# Patient Record
Sex: MALE | Race: WHITE | NOT HISPANIC OR LATINO | Employment: FULL TIME | URBAN - METROPOLITAN AREA
[De-identification: names, ages, dates, MRNs, and addresses within clinical notes are randomized per-mention and may not be internally consistent; named-entity substitution may affect disease eponyms.]

---

## 2020-03-11 ENCOUNTER — OFFICE VISIT (OUTPATIENT)
Dept: UROLOGY | Facility: MEDICAL CENTER | Age: 60
End: 2020-03-11
Payer: COMMERCIAL

## 2020-03-11 VITALS
WEIGHT: 209.2 LBS | BODY MASS INDEX: 30.98 KG/M2 | HEIGHT: 69 IN | HEART RATE: 93 BPM | DIASTOLIC BLOOD PRESSURE: 80 MMHG | SYSTOLIC BLOOD PRESSURE: 142 MMHG

## 2020-03-11 DIAGNOSIS — N13.8 BPH WITH OBSTRUCTION/LOWER URINARY TRACT SYMPTOMS: Primary | ICD-10-CM

## 2020-03-11 DIAGNOSIS — N40.1 BPH WITH OBSTRUCTION/LOWER URINARY TRACT SYMPTOMS: Primary | ICD-10-CM

## 2020-03-11 LAB
POST-VOID RESIDUAL VOLUME, ML POC: 52 ML
SL AMB  POCT GLUCOSE, UA: NORMAL
SL AMB LEUKOCYTE ESTERASE,UA: NORMAL
SL AMB POCT BILIRUBIN,UA: NORMAL
SL AMB POCT BLOOD,UA: NORMAL
SL AMB POCT CLARITY,UA: CLEAR
SL AMB POCT COLOR,UA: YELLOW
SL AMB POCT KETONES,UA: NORMAL
SL AMB POCT NITRITE,UA: NORMAL
SL AMB POCT PH,UA: 5.5
SL AMB POCT SPECIFIC GRAVITY,UA: 1.02
SL AMB POCT URINE PROTEIN: NORMAL
SL AMB POCT UROBILINOGEN: 0.2

## 2020-03-11 PROCEDURE — 81003 URINALYSIS AUTO W/O SCOPE: CPT | Performed by: UROLOGY

## 2020-03-11 PROCEDURE — 99244 OFF/OP CNSLTJ NEW/EST MOD 40: CPT | Performed by: UROLOGY

## 2020-03-11 PROCEDURE — 51798 US URINE CAPACITY MEASURE: CPT | Performed by: UROLOGY

## 2020-03-11 RX ORDER — PRASUGREL 10 MG/1
10 TABLET, FILM COATED ORAL DAILY
COMMUNITY
Start: 2017-12-29

## 2020-03-11 RX ORDER — FINASTERIDE 5 MG/1
5 TABLET, FILM COATED ORAL DAILY
Qty: 90 TABLET | Refills: 3 | Status: SHIPPED | OUTPATIENT
Start: 2020-03-11 | End: 2020-03-13 | Stop reason: SDUPTHER

## 2020-03-11 RX ORDER — LISINOPRIL 20 MG/1
20 TABLET ORAL DAILY
COMMUNITY
Start: 2013-05-14

## 2020-03-11 RX ORDER — DULOXETIN HYDROCHLORIDE 30 MG/1
30 CAPSULE, DELAYED RELEASE ORAL DAILY
COMMUNITY
Start: 2020-01-16

## 2020-03-11 RX ORDER — LEVOTHYROXINE SODIUM 0.03 MG/1
25 TABLET ORAL DAILY
COMMUNITY
Start: 2013-05-14

## 2020-03-11 RX ORDER — ESOMEPRAZOLE MAGNESIUM 40 MG/1
40 CAPSULE, DELAYED RELEASE ORAL
COMMUNITY

## 2020-03-11 RX ORDER — ROSUVASTATIN CALCIUM 20 MG/1
20 TABLET, COATED ORAL DAILY
COMMUNITY
Start: 2020-01-13

## 2020-03-11 RX ORDER — SILODOSIN 8 MG/1
CAPSULE ORAL
COMMUNITY
End: 2020-05-15 | Stop reason: SDUPTHER

## 2020-03-11 RX ORDER — METOPROLOL TARTRATE 50 MG/1
25 TABLET, FILM COATED ORAL EVERY 12 HOURS SCHEDULED
COMMUNITY
Start: 2017-12-29

## 2020-03-11 NOTE — PROGRESS NOTES
Assessment/Plan:      Diagnoses and all orders for this visit:    BPH with obstruction/lower urinary tract symptoms  -     POCT Measure PVR  -     finasteride (PROSCAR) 5 mg tablet; Take 1 tablet (5 mg total) by mouth daily  -     Cystoscopy; Future    Other orders  -     metoprolol tartrate (LOPRESSOR) 50 mg tablet; Take 25 mg by mouth  -     prasugrel (EFFIENT) tablet; Take 10 mg by mouth  -     rosuvastatin (CRESTOR) 20 MG tablet  -     DULoxetine (CYMBALTA) 30 mg delayed release capsule  -     levothyroxine 25 mcg tablet; Take 25 mcg by mouth  -     lisinopril (ZESTRIL) 20 mg tablet; Take 20 mg by mouth  -     esomeprazole (NexIUM) 40 MG capsule; Take 40 mg by mouth every morning before breakfast  -     Silodosin (Rapaflo) 8 MG CAPS; Take by mouth        Plan:  Patient would like to try the finasteride for 6 months, and if he perceives a benefit then he will remain on such  Will give him an appointment to see Dr Brenda Teresa in 6 months for cystoscopy, where if needed, he will be able to  which of the prostate intervention would be best for the patient to proceed with  Subjective:  Progressive BPH symptoms     Patient ID: Josias Andrade is a 60 y o  male  HPI  Patient has had a history of BPH for several years has been on Rapaflo  He is developing more symptoms with difficulty voiding  He is interested in proceeding with some alternative treatments  Options included adding finasteride, or possibly a minimally invasive or surgical option such as the GreenLight, Urolift and TURP  Review of Systems   Constitutional: Negative  HENT: Negative  Eyes: Negative  Respiratory: Negative  Cardiovascular: Negative  Gastrointestinal: Negative  Endocrine: Negative  Genitourinary: Positive for difficulty urinating  Musculoskeletal: Negative  Skin: Negative  Allergic/Immunologic: Negative  Neurological: Negative  Hematological: Negative  Psychiatric/Behavioral: Negative  Objective:     Physical Exam   Constitutional: He is oriented to person, place, and time  He appears well-developed and well-nourished  No distress  HENT:   Head: Normocephalic and atraumatic  Nose: Nose normal    Mouth/Throat: Oropharynx is clear and moist    Eyes: Pupils are equal, round, and reactive to light  Conjunctivae and EOM are normal  No scleral icterus  Neck: Normal range of motion  Neck supple  Cardiovascular: Normal rate, regular rhythm, normal heart sounds and intact distal pulses  No murmur heard  Pulmonary/Chest: Effort normal and breath sounds normal  No respiratory distress  He has no wheezes  He has no rales  Abdominal: Soft  Bowel sounds are normal  He exhibits no distension and no mass  There is no tenderness  Genitourinary: Prostate is enlarged  Genitourinary Comments: Prostate exam:  2 to 3/4 enlargement, smooth, with no nodules or induration  Musculoskeletal: Normal range of motion  He exhibits no edema or tenderness  Lymphadenopathy:     He has no cervical adenopathy  Neurological: He is alert and oriented to person, place, and time  No cranial nerve deficit  Skin: Skin is warm and dry  No rash noted  No erythema  No pallor  Psychiatric: He has a normal mood and affect  His behavior is normal  Judgment and thought content normal    Nursing note and vitals reviewed        Bladder scan PVR today was 52 cc    PSA, TOTAL (SCREENING)Resulted: 1/28/2020  [facility]  Component Name Value Ref Range   PSA, Total 1 5      The findings below are from the cystoscopy performed at [facility]  Findings include:  Urethra - Normal, no evidence of any stricture disease  Prostate - Bilobar obstructing hypertrophy, Massive median lobe, severe IPP  UO's - orthotopic, clear efflux B/L, far away from bladder neck  Urothelium - No abnormalities  Wall - Multiple trabeculations, No diverticula

## 2020-03-12 DIAGNOSIS — N40.1 BPH WITH OBSTRUCTION/LOWER URINARY TRACT SYMPTOMS: ICD-10-CM

## 2020-03-12 DIAGNOSIS — N13.8 BPH WITH OBSTRUCTION/LOWER URINARY TRACT SYMPTOMS: ICD-10-CM

## 2020-03-12 NOTE — TELEPHONE ENCOUNTER
Patient managed by Dr Cheyanne Wilkerson seen at [facility]    Patient called in stating medication finasteride should go to [facility]  [address] [place], [address] [address]  Phone number [phone]   Order was sent to [facility]    Patient can be reached at [phone]

## 2020-03-13 RX ORDER — FINASTERIDE 5 MG/1
5 TABLET, FILM COATED ORAL DAILY
Qty: 90 TABLET | Refills: 3 | Status: SHIPPED | OUTPATIENT
Start: 2020-03-13 | End: 2021-07-16 | Stop reason: ALTCHOICE

## 2020-03-13 NOTE — TELEPHONE ENCOUNTER
Call transfer to me  Dr Osbaldo Cuellar sent the original script to [facility] when it should have gone to [facility]/pharmacy in [place]  Pharmacy information was not updated correctly at check-in    Pharmacy information corrected accordingly and the script was requeued and forwarded to the Advanced Practitioner covering the [facility] location for approval

## 2020-05-13 ENCOUNTER — TELEPHONE (OUTPATIENT)
Dept: OTHER | Facility: OTHER | Age: 60
End: 2020-05-13

## 2020-05-13 DIAGNOSIS — N52.9 ERECTILE DYSFUNCTION, UNSPECIFIED ERECTILE DYSFUNCTION TYPE: Primary | ICD-10-CM

## 2020-05-13 DIAGNOSIS — N40.0 BPH WITHOUT OBSTRUCTION/LOWER URINARY TRACT SYMPTOMS: ICD-10-CM

## 2020-05-15 ENCOUNTER — TELEPHONE (OUTPATIENT)
Dept: UROLOGY | Facility: MEDICAL CENTER | Age: 60
End: 2020-05-15

## 2020-05-15 RX ORDER — TADALAFIL 20 MG/1
20 TABLET ORAL DAILY PRN
Qty: 10 TABLET | Refills: 0 | Status: SHIPPED | OUTPATIENT
Start: 2020-05-15 | End: 2021-07-16 | Stop reason: SDUPTHER

## 2020-05-15 RX ORDER — SILODOSIN 8 MG/1
8 CAPSULE ORAL DAILY
Qty: 90 CAPSULE | Refills: 3 | Status: SHIPPED | OUTPATIENT
Start: 2020-05-15 | End: 2021-05-11

## 2020-07-06 ENCOUNTER — PROCEDURE VISIT (OUTPATIENT)
Dept: UROLOGY | Facility: MEDICAL CENTER | Age: 60
End: 2020-07-06
Payer: COMMERCIAL

## 2020-07-06 VITALS
BODY MASS INDEX: 30.07 KG/M2 | SYSTOLIC BLOOD PRESSURE: 130 MMHG | HEART RATE: 71 BPM | WEIGHT: 203 LBS | DIASTOLIC BLOOD PRESSURE: 76 MMHG | HEIGHT: 69 IN

## 2020-07-06 DIAGNOSIS — N40.1 BPH WITH OBSTRUCTION/LOWER URINARY TRACT SYMPTOMS: Primary | ICD-10-CM

## 2020-07-06 DIAGNOSIS — N13.8 BPH WITH OBSTRUCTION/LOWER URINARY TRACT SYMPTOMS: Primary | ICD-10-CM

## 2020-07-06 LAB
POST-VOID RESIDUAL VOLUME, ML POC: 89 ML
SL AMB  POCT GLUCOSE, UA: NORMAL
SL AMB LEUKOCYTE ESTERASE,UA: NORMAL
SL AMB POCT BILIRUBIN,UA: NORMAL
SL AMB POCT BLOOD,UA: NORMAL
SL AMB POCT CLARITY,UA: CLEAR
SL AMB POCT COLOR,UA: YELLOW
SL AMB POCT KETONES,UA: NORMAL
SL AMB POCT NITRITE,UA: NORMAL
SL AMB POCT PH,UA: 5.5
SL AMB POCT SPECIFIC GRAVITY,UA: 1.02
SL AMB POCT URINE PROTEIN: NORMAL
SL AMB POCT UROBILINOGEN: 0.2

## 2020-07-06 PROCEDURE — 99214 OFFICE O/P EST MOD 30 MIN: CPT | Performed by: UROLOGY

## 2020-07-06 PROCEDURE — 76872 US TRANSRECTAL: CPT | Performed by: UROLOGY

## 2020-07-06 PROCEDURE — 52000 CYSTOURETHROSCOPY: CPT | Performed by: UROLOGY

## 2020-07-06 PROCEDURE — 81003 URINALYSIS AUTO W/O SCOPE: CPT | Performed by: UROLOGY

## 2020-07-06 PROCEDURE — 51798 US URINE CAPACITY MEASURE: CPT | Performed by: UROLOGY

## 2020-07-06 RX ORDER — ALPRAZOLAM 0.5 MG/1
TABLET ORAL
COMMUNITY
Start: 2020-04-21

## 2020-07-06 RX ORDER — LORATADINE 10 MG
81 TABLET ORAL DAILY
COMMUNITY
Start: 2020-05-28

## 2020-07-06 NOTE — PATIENT INSTRUCTIONS

## 2020-07-06 NOTE — LETTER
July 6, 2020     Jan Mendez MD  [address]  [facility] [address] [address]    Patient: Sony Hines   YOB: 1960   Date of Visit: 7/6/2020       Dear Dr Jacinda Zelaya: Thank you for referring Sony Hines to me for evaluation  Below are my notes for this consultation  If you have questions, please do not hesitate to call me  I look forward to following your patient along with you  Sincerely,        Denzel Snell MD        CC: No Recipients  Denzel Snell MD  7/6/2020 11:31 AM  Sign at close encounter  Assessment/Plan:    BPH with obstruction/lower urinary tract symptoms    AUA symptom score is 11 on Rapaflo  He is mixed about his voiding pattern  Cystoscopically there is a large median lobe that does not appear amenable to urolift  We discussed other treatment options including GreenLight laser as well as transurethral resection of the prostate  The pros and cons of each were discussed  At the conclusion were discussion the patient was leaning toward GreenLight laser however he wished to discuss his options further with his  Wife  He will call should he wish to proceed  GreenLight laser information was given to the patient  Diagnoses and all orders for this visit:    BPH with obstruction/lower urinary tract symptoms  -     POCT Measure PVR  -     POCT urine dip auto non-scope  -     Uroflow    Other orders  -     ALPRAZolam (XANAX) 0 5 mg tablet; TAKE 1/2 1 TABLETS (0 25 0 5 MG TOTAL) BY MOUTH DAILY AS NEEDED  FOR ANXIETY  -     [facility] ASPIRIN ADULT LOW DOSE 81 MG chewable tablet; Chew 81 mg daily  -     metFORMIN (GLUCOPHAGE) 1000 MG tablet; Take 1,000 mg by mouth 2 (two) times a day with meals  -     Cystoscopy          Subjective:      Patient ID: Sony Hinse is a 63 y o  male  Benign Prostatic Hypertrophy   This is a chronic problem  The current episode started more than 1 year ago  The problem is unchanged  Irritative symptoms do not include frequency, nocturia or urgency  Obstructive symptoms include dribbling, a slower stream and a weak stream  Obstructive symptoms do not include incomplete emptying, an intermittent stream or straining  Pertinent negatives include no chills, dysuria, genital pain, hematuria, hesitancy, nausea or vomiting  AUA score is 8-19  His sexual activity is non-contributory to the current illness  The symptoms are aggravated by caffeine and alcohol  Treatments tried: rapaflo  The treatment provided mild relief  He has been using treatment for 2 or more years  He tried Finasteride but it caused side effects and he discontinued the medication  The following portions of the patient's history were reviewed and updated as appropriate: allergies, current medications, past family history, past medical history, past social history, past surgical history and problem list     Review of Systems   Constitutional: Negative for chills, diaphoresis, fatigue and fever  HENT: Negative  Eyes: Negative  Respiratory: Negative  Cardiovascular: Negative  Gastrointestinal: Negative for nausea and vomiting  Endocrine: Negative  Genitourinary: Negative for dysuria, frequency, hematuria, hesitancy, incomplete emptying, nocturia and urgency  See HPI   Musculoskeletal: Negative  Skin: Negative  Allergic/Immunologic: Negative  Neurological: Negative  Hematological: Negative  Psychiatric/Behavioral: Negative  Objective:      /76   Pulse 71   Ht 5' 9" (1 753 m)   Wt 92 1 kg (203 lb)   BMI 29 98 kg/m²           Physical Exam   Constitutional: He is oriented to person, place, and time  He appears well-developed and well-nourished  HENT:   Head: Normocephalic and atraumatic  Eyes: Conjunctivae are normal    Neck: Neck supple  Cardiovascular: Normal rate  Pulmonary/Chest: Effort normal    Abdominal: Soft  Bowel sounds are normal  He exhibits no distension and no mass  There is no tenderness   There is no rebound, no guarding and no CVA tenderness  No hernia  Genitourinary: Rectum normal, testes normal and penis normal  Right testis shows no mass  Left testis shows no mass  No phimosis or hypospadias  Genitourinary Comments: Prostate 2+ enlarged and palpably benign   Musculoskeletal: He exhibits no edema  Neurological: He is alert and oriented to person, place, and time  Skin: Skin is warm and dry  Psychiatric: He has a normal mood and affect  His behavior is normal  Judgment and thought content normal    Vitals reviewed  Cystoscopy  Date/Time: 7/6/2020 11:08 AM  Performed by: Omar Kerr MD  Authorized by: Omar Kerr MD     Procedure details: cystoscopy    Patient tolerance: Patient tolerated the procedure well with no immediate complications    Additional Procedure Details: Cystoscopy Procedure Note        Pre-operative Diagnosis: Voiding dysfunction    Post-operative Diagnosis: Prostatic obstruction with large median lobe      Procedure Details   The risks, benefits, complications, treatment options, and expected outcomes were discussed with the patient  The patient concurred with the proposed plan, giving informed consent  Cystoscopy was performed today under local anesthesia, using sterile technique  The patient was placed in the supine position, prepped and draped in the usual sterile fashion  A 15 Maori flexible cystoscope  was used to inspect both the urethra and bladder  Findings:  Normal urethra, trilobar prostatic hypertrophy approximately 60 g with a very enlarged median lobe  Ureteral orifices are normal   Bladder is moderately trabeculated  There are no tumors or diverticula noted  Transrectal ultrasound of the prostate     transrectal ultrasound the prostate was performed with the 8 megahertz transrectal probe  The seminal vesicles are normal appearance  The prostate is enlarged measuring 75 g in size    There are no hypoechoic lesions noted in the peripheral zone  The transition zone is enlarged and heterogeneous in echotexture  Scattered cysts and calcifications are noted  There is a large median lobe  The remainder of the visualized bladder is unremarkable  The prostate measures 5 2 cm in height, 5 cm in width and 5 5 cm in length  Impression: Prostatic enlargement- enlarged median lobe  Uroflow with PVR      The patient voided 143 cc with a maximum flow of 2 9 mL/sec  The voiding pattern is prolonged period postvoid residuals 89 cc  Impression:  Slow urinary stream with prolonged voiding time and low peak flow consistent with outlet obstruction

## 2020-07-06 NOTE — ASSESSMENT & PLAN NOTE
AUA symptom score is 11 on Rapaflo  He is mixed about his voiding pattern  Cystoscopically there is a large median lobe that does not appear amenable to urolift  We discussed other treatment options including GreenLight laser as well as transurethral resection of the prostate  The pros and cons of each were discussed  At the conclusion were discussion the patient was leaning toward GreenLight laser however he wished to discuss his options further with his  Wife  He will call should he wish to proceed  GreenLight laser information was given to the patient

## 2020-07-06 NOTE — PROGRESS NOTES
Uroflow  Date/Time: 7/6/2020 11:13 AM  Performed by: Johanna Schneider MA  Authorized by: Johanna Schneider MA       Comments:      Uroflow was performed following cystoscopy  The results are reviewed by Dr Paul Joel and noted on his procedure note

## 2021-05-11 DIAGNOSIS — N40.0 BPH WITHOUT OBSTRUCTION/LOWER URINARY TRACT SYMPTOMS: ICD-10-CM

## 2021-05-11 RX ORDER — SILODOSIN 8 MG/1
CAPSULE ORAL
Qty: 90 CAPSULE | Refills: 3 | Status: SHIPPED | OUTPATIENT
Start: 2021-05-11 | End: 2021-07-16 | Stop reason: SDUPTHER

## 2021-07-16 ENCOUNTER — OFFICE VISIT (OUTPATIENT)
Dept: UROLOGY | Facility: MEDICAL CENTER | Age: 61
End: 2021-07-16
Payer: COMMERCIAL

## 2021-07-16 VITALS
DIASTOLIC BLOOD PRESSURE: 80 MMHG | HEART RATE: 67 BPM | SYSTOLIC BLOOD PRESSURE: 110 MMHG | HEIGHT: 65 IN | BODY MASS INDEX: 34.16 KG/M2 | WEIGHT: 205 LBS

## 2021-07-16 DIAGNOSIS — E11.9 TYPE 2 DIABETES MELLITUS WITHOUT COMPLICATION, WITHOUT LONG-TERM CURRENT USE OF INSULIN (HCC): ICD-10-CM

## 2021-07-16 DIAGNOSIS — N52.9 ERECTILE DYSFUNCTION, UNSPECIFIED ERECTILE DYSFUNCTION TYPE: ICD-10-CM

## 2021-07-16 DIAGNOSIS — N40.1 BPH WITH OBSTRUCTION/LOWER URINARY TRACT SYMPTOMS: Primary | ICD-10-CM

## 2021-07-16 DIAGNOSIS — N40.0 BPH WITHOUT OBSTRUCTION/LOWER URINARY TRACT SYMPTOMS: ICD-10-CM

## 2021-07-16 DIAGNOSIS — N13.8 BPH WITH OBSTRUCTION/LOWER URINARY TRACT SYMPTOMS: Primary | ICD-10-CM

## 2021-07-16 PROCEDURE — 99214 OFFICE O/P EST MOD 30 MIN: CPT | Performed by: UROLOGY

## 2021-07-16 RX ORDER — BENZONATATE 100 MG/1
CAPSULE ORAL
COMMUNITY
Start: 2021-06-03 | End: 2022-01-06

## 2021-07-16 RX ORDER — CHLORAL HYDRATE 500 MG
1200 CAPSULE ORAL DAILY
COMMUNITY

## 2021-07-16 RX ORDER — ASCORBIC ACID 500 MG
500 TABLET ORAL DAILY
COMMUNITY

## 2021-07-16 RX ORDER — SILODOSIN 8 MG/1
1 CAPSULE ORAL DAILY
Qty: 90 CAPSULE | Refills: 3 | Status: SHIPPED | OUTPATIENT
Start: 2021-07-16

## 2021-07-16 RX ORDER — TADALAFIL 20 MG/1
20 TABLET ORAL DAILY PRN
Qty: 30 TABLET | Refills: 3 | Status: SHIPPED | OUTPATIENT
Start: 2021-07-16

## 2021-07-16 RX ORDER — DUTASTERIDE 0.5 MG/1
0.5 CAPSULE, LIQUID FILLED ORAL 3 TIMES WEEKLY
Qty: 60 CAPSULE | Refills: 3 | Status: SHIPPED | OUTPATIENT
Start: 2021-07-16

## 2021-07-16 NOTE — PROGRESS NOTES
Assessment/Plan:    BPH with obstruction/lower urinary tract symptoms  AUA symptom score is presently 11  He is mixed about his voiding pattern  He has not had a recent PSA but digital rectal examination is benign in nature  Options were discussed  The patient is still considering photoselective vaporization the prostate with GreenLight laser  In the interim I recommended he continue still dose in 8 mg daily  He will also begin dutasteride 3 times weekly to decrease prostate size  He will obtain a PSA level and will return in 1 year if all is well  Erectile dysfunction  Viagra has been less effective  The patient requested a trial of Cialis 20 mg  Use and side effects were discussed  Diagnoses and all orders for this visit:    BPH with obstruction/lower urinary tract symptoms  -     PSA Total, Diagnostic; Future  -     dutasteride (AVODART) 0 5 mg capsule; Take 1 capsule (0 5 mg total) by mouth 3 (three) times a week  -     PSA Total, Diagnostic  -     PSA Total, Diagnostic; Future  -     PSA Total, Diagnostic    Erectile dysfunction, unspecified erectile dysfunction type  -     tadalafil (CIALIS) 20 MG tablet; Take 1 tablet (20 mg total) by mouth daily as needed for erectile dysfunction (no more than 2 doses weekly)    BPH without obstruction/lower urinary tract symptoms  -     Silodosin 8 MG CAPS; Take 1 capsule by mouth daily    Type 2 diabetes mellitus without complication, without long-term current use of insulin (HCC)    Other orders  -     benzonatate (TESSALON PERLES) 100 mg capsule; TAKE 1 CAPSULE BY MOUTH THREE TIMES A DAY AS NEEDED FOR COUGH  -     ascorbic acid (VITAMIN C) 500 MG tablet; Take 500 mg by mouth daily  -     Omega-3 1000 MG CAPS; Take 1,200 mg by mouth daily          Subjective:      Patient ID: Melissa Park is a 57 y o  male  Benign Prostatic Hypertrophy  This is a chronic problem  The current episode started more than 1 year ago  The problem is unchanged   Irritative symptoms do not include frequency, nocturia or urgency  Obstructive symptoms include a slower stream and straining  Obstructive symptoms do not include dribbling, incomplete emptying, an intermittent stream or a weak stream  Pertinent negatives include no chills, dysuria, genital pain, hematuria, hesitancy, nausea or vomiting  AUA score is 8-19  His sexual activity is non-contributory to the current illness  Past treatments include finasteride (rapaflo)  The treatment provided mild relief  Erectile Dysfunction  This is a chronic problem  The current episode started more than 1 year ago  The problem is unchanged  The nature of his difficulty is maintaining erection  Irritative symptoms do not include frequency, nocturia or urgency  Obstructive symptoms include a slower stream and straining  Obstructive symptoms do not include dribbling, incomplete emptying, an intermittent stream or a weak stream  Pertinent negatives include no chills, dysuria, genital pain, hematuria or hesitancy  Past treatments include tadalafil  The treatment provided moderate relief  He has been using treatment for 1 to 2 years  He has had no adverse reactions caused by medications  Risk factors include BPH  The following portions of the patient's history were reviewed and updated as appropriate: allergies, current medications, past family history, past medical history, past social history, past surgical history and problem list     Review of Systems   Constitutional: Negative for chills, diaphoresis, fatigue and fever  HENT: Negative  Eyes: Negative  Respiratory: Negative  Cardiovascular: Negative  Gastrointestinal: Negative for nausea and vomiting  Endocrine: Negative  Genitourinary: Negative for dysuria, frequency, hematuria, hesitancy, incomplete emptying, nocturia and urgency  See HPI   Musculoskeletal: Negative  Skin: Negative  Allergic/Immunologic: Negative  Neurological: Negative  Hematological: Negative  Psychiatric/Behavioral: Negative  AUA SYMPTOM SCORE      Most Recent Value   AUA SYMPTOM SCORE   How often have you had a sensation of not emptying your bladder completely after you finished urinating? 2   How often have you had to urinate again less than two hours after you finished urinating? 1   How often have you found you stopped and started again several times when you urinate? 1   How often have you found it difficult to postpone urination? 2   How often have you had a weak urinary stream?  2   How often have you had to push or strain to begin urination? 2   How many times did you most typically get up to urinate from the time you went to bed at night until the time you got up in the morning? 1   Quality of Life: If you were to spend the rest of your life with your urinary condition just the way it is now, how would you feel about that?  3   AUA SYMPTOM SCORE  11        Objective:      /80   Pulse 67   Ht 5' 5" (1 651 m)   Wt 93 kg (205 lb)   BMI 34 11 kg/m²          Physical Exam  Vitals reviewed  Constitutional:       General: He is not in acute distress  Appearance: Normal appearance  He is well-developed and normal weight  He is not ill-appearing, toxic-appearing or diaphoretic  HENT:      Head: Normocephalic and atraumatic  Eyes:      General: No scleral icterus  Conjunctiva/sclera: Conjunctivae normal    Cardiovascular:      Rate and Rhythm: Normal rate  Pulmonary:      Effort: Pulmonary effort is normal    Abdominal:      General: There is no distension  Palpations: Abdomen is soft  There is no mass  Tenderness: There is no abdominal tenderness  There is no right CVA tenderness, left CVA tenderness, guarding or rebound  Hernia: No hernia is present  Comments: No hernia   Genitourinary:     Penis: Normal        Testes: Normal       Comments: Prostate 2+ enlarged and palpably benign    Musculoskeletal: General: Normal range of motion  Cervical back: Neck supple  Skin:     General: Skin is warm and dry  Neurological:      General: No focal deficit present  Mental Status: He is alert and oriented to person, place, and time  Mental status is at baseline  Psychiatric:         Mood and Affect: Mood normal          Behavior: Behavior normal          Thought Content:  Thought content normal          Judgment: Judgment normal

## 2021-07-16 NOTE — ASSESSMENT & PLAN NOTE
Viagra has been less effective  The patient requested a trial of Cialis 20 mg  Use and side effects were discussed

## 2021-07-16 NOTE — PATIENT INSTRUCTIONS
Tadalafil (By mouth)   Tadalafil ([id number])  Treats erectile dysfunction and the symptoms of benign prostatic hyperplasia (enlarged prostate)  Also treats pulmonary arterial hypertension (high blood pressure in the lungs) to improve your exercise ability  Brand Name(s): Adcirca, Alyq, Cialis   There may be other brand names for this medicine  When This Medicine Should Not Be Used: This medicine is not right for everyone  Do not use it if you had an allergic reaction to tadalafil  How to Use This Medicine:   Tablet  · Take your medicine as directed  Your dose may need to be changed several times to find what works best for you  · Swallow the tablet whole  Do not split, break, chew, or crush it  · Use only the brand of medicine your doctor prescribed  Other brands may not work the same way  · Read and follow the patient instructions that come with this medicine  Talk to your doctor or pharmacist if you have any questions  · Missed dose: Take a dose as soon as you remember  If it is almost time for your next dose, wait until then and take a regular dose  Do not take extra medicine to make up for a missed dose  · Store the medicine in a closed container at room temperature, away from heat, moisture, and direct light  Drugs and Foods to Avoid:   Ask your doctor or pharmacist before using any other medicine, including over-the-counter medicines, vitamins, and herbal products  · Do not use this medicine if you are also taking riociguat or a nitrate medicine  You may use a nitrate medicine at least 48 hours after your last dose of tadalafil  Do not take other medicines that contain tadalafil or similar medicines, including sildenafil or vardenafil  · Some foods and medicines can affect how tadalafil works  Tell your doctor if you are using any of the following:  ? Bosentan, carbamazepine, erythromycin, indinavir, itraconazole, ketoconazole, phenobarbital, phenytoin, rifampin, ritonavir  ?  Blood pressure medicine (including alfuzosin, amlodipine, bendroflumethiazide, candesartan, doxazosin, enalapril, losartan, metoprolol, tamsulosin, terazosin)  ? Medicine to treat prostate problems  · Do not have 5 or more alcohol-containing drinks in a short period of time while you are using this medicine  · Do not eat grapefruit or drink grapefruit juice while you are using this medicine  Warnings While Using This Medicine:   · Tell your doctor if you are pregnant or breastfeeding, or if you have kidney disease, liver disease, lung or breathing problems, diabetes, high cholesterol, cancer (including leukemia, multiple myeloma), sickle cell anemia, bleeding problems, stomach ulcer, problems with your penis, or eye problems  Tell your doctor if you have angina or chest pain during sex, heart disease, heart rhythm problems, high or low blood pressure, or a history of heart attack or stroke  Also tell your doctor if you smoke or drink alcohol  · Tell any doctor who treats you that you are using tadalafil  · This medicine may cause the following problems:   ? Low blood pressure (especially if taken with other medicines that lower blood pressure)  ? Painful or prolonged erection  ? Hearing or vision problems  · Your doctor will do lab tests at regular visits to check on the effects of this medicine  Keep all appointments  · Keep all medicine out of the reach of children  Never share your medicine with anyone    Possible Side Effects While Using This Medicine:   Call your doctor right away if you notice any of these side effects:  · Allergic reaction: Itching or hives, swelling in your face or hands, swelling or tingling in your mouth or throat, chest tightness, trouble breathing  · Blistering, peeling, or red skin rash  · Chest pain, trouble breathing  · Lightheadedness, fainting  · Painful erection or an erection that lasts longer than 4 hours with or without pain  · Sudden decrease in hearing or hearing loss, ringing in the ears, dizziness  · Sudden loss of vision  If you notice these less serious side effects, talk with your doctor:   · Back or muscle pain  · Headache  · Stuffy or runny nose  · Upset stomach, nausea  · Warmth or redness in your face, neck, arms, or upper chest  If you notice other side effects that you think are caused by this medicine, tell your doctor  Call your doctor for medical advice about side effects  You may report side effects to FDA at 5-103-ZVI-3229  © Copyright [address] 2021 Information is for End User's use only and may not be sold, redistributed or otherwise used for commercial purposes  The above information is an  only  It is not intended as medical advice for individual conditions or treatments  Talk to your doctor, nurse or pharmacist before following any medical regimen to see if it is safe and effective for you

## 2021-07-16 NOTE — ASSESSMENT & PLAN NOTE
AUA symptom score is presently 11  He is mixed about his voiding pattern  He has not had a recent PSA but digital rectal examination is benign in nature  Options were discussed  The patient is still considering photoselective vaporization the prostate with GreenLight laser  In the interim I recommended he continue still dose in 8 mg daily  He will also begin dutasteride 3 times weekly to decrease prostate size  He will obtain a PSA level and will return in 1 year if all is well

## 2021-07-18 LAB
BUN SERPL-MCNC: 17 MG/DL (ref 8–27)
BUN/CREAT SERPL: 16 (ref 10–24)
CALCIUM SERPL-MCNC: 9.2 MG/DL (ref 8.6–10.2)
CHLORIDE SERPL-SCNC: 104 MMOL/L (ref 96–106)
CHOLEST SERPL-MCNC: 138 MG/DL (ref 100–199)
CO2 SERPL-SCNC: 24 MMOL/L (ref 20–29)
CREAT SERPL-MCNC: 1.08 MG/DL (ref 0.76–1.27)
GLUCOSE SERPL-MCNC: 122 MG/DL (ref 65–99)
HBA1C MFR BLD: 6.9 % (ref 4.8–5.6)
HDLC SERPL-MCNC: 28 MG/DL
LDLC SERPL CALC-MCNC: 73 MG/DL (ref 0–99)
POTASSIUM SERPL-SCNC: 4.7 MMOL/L (ref 3.5–5.2)
PSA SERPL-MCNC: 1.1 NG/ML (ref 0–4)
SL AMB EGFR AFRICAN AMERICAN: 85 ML/MIN/1.73
SL AMB EGFR NON AFRICAN AMERICAN: 73 ML/MIN/1.73
SL AMB VLDL CHOLESTEROL CALC: 37 MG/DL (ref 5–40)
SODIUM SERPL-SCNC: 141 MMOL/L (ref 134–144)
TRIGL SERPL-MCNC: 225 MG/DL (ref 0–149)
TSH SERPL DL<=0.005 MIU/L-ACNC: 1.87 UIU/ML (ref 0.45–4.5)

## 2022-01-06 NOTE — PRE-PROCEDURE INSTRUCTIONS
Pre-Surgery Instructions:   Medication Instructions    ALPRAZolam (XANAX) 0 5 mg tablet Instructed to take as needed including DOS    ascorbic acid (VITAMIN C) 500 MG tablet Instructed to avoid all OTC Vit/Supp 1 week prior to surgery and to avoid NSAIDs 3 days prior to surgery per anesthesia instructions  Tylenol ok to take prn   [facility] ASPIRIN ADULT LOW DOSE 81 MG chewable tablet Patient was instructed to contact Physician for medication instruction   DULoxetine (CYMBALTA) 30 mg delayed release capsule Instructed to take per normal schedule including DOS with sips water    esomeprazole (NexIUM) 40 MG capsule Instructed to take per normal schedule including DOS with sips water    levothyroxine 25 mcg tablet Instructed to take per normal schedule including DOS with sips water    lisinopril (ZESTRIL) 20 mg tablet Continue this medication up to the evening before surgery/procedure, but do not take the morning of the day of surgery   metFORMIN (GLUCOPHAGE) 1000 MG tablet Continue this medication up to the evening before surgery/procedure, but do not take the morning of the day of surgery   metoprolol tartrate (LOPRESSOR) 50 mg tablet Instructed to take per normal schedule including DOS with sips water    Omega-3 1000 MG CAPS Stop taking    prasugrel (EFFIENT) tablet Patient was instructed to contact Physician for medication instruction   rosuvastatin (CRESTOR) 20 MG tablet Instructed to take per normal schedule including DOS with sips water    Silodosin 8 MG CAPS Take evening    tadalafil (CIALIS) 20 MG tablet Don't take morning day of surgery   Have you had / have a sore throat? No   Have you had / have a cough less than 1 week? No  Have you had / have a fever greater than 100 0 - 100  4? No  Are you experiencing any shortness of breath? No  Fully vaccinated    Review with patient via phone medications and showering instructions   Advised ASC call with surgery schedule time, nothing eat or drink after midnight  Verbalized understanding

## 2022-01-10 ENCOUNTER — ANESTHESIA EVENT (OUTPATIENT)
Dept: PERIOP | Facility: HOSPITAL | Age: 62
End: 2022-01-10
Payer: COMMERCIAL

## 2022-01-12 ENCOUNTER — ANESTHESIA (OUTPATIENT)
Dept: PERIOP | Facility: HOSPITAL | Age: 62
End: 2022-01-12
Payer: COMMERCIAL

## 2022-01-12 ENCOUNTER — HOSPITAL ENCOUNTER (OUTPATIENT)
Facility: HOSPITAL | Age: 62
Setting detail: OUTPATIENT SURGERY
Discharge: HOME | End: 2022-01-12
Attending: OTOLARYNGOLOGY | Admitting: OTOLARYNGOLOGY
Payer: COMMERCIAL

## 2022-01-12 VITALS
WEIGHT: 204.59 LBS | TEMPERATURE: 98.7 F | DIASTOLIC BLOOD PRESSURE: 71 MMHG | OXYGEN SATURATION: 96 % | SYSTOLIC BLOOD PRESSURE: 134 MMHG | RESPIRATION RATE: 18 BRPM | HEART RATE: 68 BPM | BODY MASS INDEX: 31.11 KG/M2

## 2022-01-12 PROBLEM — Z95.5 HISTORY OF CORONARY ANGIOPLASTY WITH INSERTION OF STENT: Status: ACTIVE | Noted: 2022-01-12

## 2022-01-12 LAB
GLUCOSE SERPL-MCNC: 132 MG/DL (ref 65–140)
GLUCOSE SERPL-MCNC: 137 MG/DL (ref 65–140)

## 2022-01-12 PROCEDURE — 82948 REAGENT STRIP/BLOOD GLUCOSE: CPT

## 2022-01-12 PROCEDURE — 42975 DISE EVAL SLP DO BRTH FLX DX: CPT | Performed by: OTOLARYNGOLOGY

## 2022-01-12 RX ORDER — ACETAMINOPHEN 160 MG/5ML
650 SUSPENSION, ORAL (FINAL DOSE FORM) ORAL ONCE
Status: DISCONTINUED | OUTPATIENT
Start: 2022-01-12 | End: 2022-01-12 | Stop reason: HOSPADM

## 2022-01-12 RX ORDER — SODIUM CHLORIDE, SODIUM LACTATE, POTASSIUM CHLORIDE, CALCIUM CHLORIDE 600; 310; 30; 20 MG/100ML; MG/100ML; MG/100ML; MG/100ML
125 INJECTION, SOLUTION INTRAVENOUS CONTINUOUS
Status: DISCONTINUED | OUTPATIENT
Start: 2022-01-12 | End: 2022-01-12 | Stop reason: HOSPADM

## 2022-01-12 RX ORDER — ONDANSETRON 2 MG/ML
4 INJECTION INTRAMUSCULAR; INTRAVENOUS ONCE AS NEEDED
Status: DISCONTINUED | OUTPATIENT
Start: 2022-01-12 | End: 2022-01-12 | Stop reason: HOSPADM

## 2022-01-12 RX ORDER — MEPERIDINE HYDROCHLORIDE 25 MG/ML
12.5 INJECTION INTRAMUSCULAR; INTRAVENOUS; SUBCUTANEOUS
Status: DISCONTINUED | OUTPATIENT
Start: 2022-01-12 | End: 2022-01-12 | Stop reason: HOSPADM

## 2022-01-12 RX ORDER — PROPOFOL 10 MG/ML
INJECTION, EMULSION INTRAVENOUS AS NEEDED
Status: DISCONTINUED | OUTPATIENT
Start: 2022-01-12 | End: 2022-01-12

## 2022-01-12 RX ORDER — FENTANYL CITRATE/PF 50 MCG/ML
25 SYRINGE (ML) INJECTION
Status: DISCONTINUED | OUTPATIENT
Start: 2022-01-12 | End: 2022-01-12 | Stop reason: HOSPADM

## 2022-01-12 RX ADMIN — PROPOFOL 30 MG: 10 INJECTION, EMULSION INTRAVENOUS at 08:19

## 2022-01-12 RX ADMIN — PROPOFOL 30 MG: 10 INJECTION, EMULSION INTRAVENOUS at 08:18

## 2022-01-12 RX ADMIN — PROPOFOL 20 MG: 10 INJECTION, EMULSION INTRAVENOUS at 08:21

## 2022-01-12 RX ADMIN — PROPOFOL 20 MG: 10 INJECTION, EMULSION INTRAVENOUS at 08:22

## 2022-01-12 RX ADMIN — SODIUM CHLORIDE, SODIUM LACTATE, POTASSIUM CHLORIDE, AND CALCIUM CHLORIDE 125 ML/HR: .6; .31; .03; .02 INJECTION, SOLUTION INTRAVENOUS at 06:59

## 2022-01-12 RX ADMIN — PROPOFOL 50 MG: 10 INJECTION, EMULSION INTRAVENOUS at 08:17

## 2022-01-12 NOTE — ANESTHESIA PREPROCEDURE EVALUATION
Procedure:  DRUG INDUCED SLEEP ENDOSCOPY (DISE) (N/A Mouth)    Relevant Problems   CARDIO   (+) Hyperlipidemia   (+) Hypertension   (+) Myocardial infarction (HCC)      ENDO   (+) Type 2 diabetes mellitus without complication, without long-term current use of insulin (HCC)      GI/HEPATIC   (+) GERD (gastroesophageal reflux disease)      /RENAL   (+) BPH with obstruction/lower urinary tract symptoms      NEURO/PSYCH   (+) Anxiety   (+) Depression      PULMONARY   (+) Asthma   (+) Sleep apnea      Other   (+) History of coronary angioplasty with insertion of stent        Physical Exam    Airway    Mallampati score: II  TM Distance: >3 FB  Neck ROM: full     Dental   No notable dental hx     Cardiovascular  Rhythm: regular, Rate: normal, Cardiovascular exam normal    Pulmonary  Pulmonary exam normal Breath sounds clear to auscultation,     Other Findings        Anesthesia Plan  ASA Score- 2     Anesthesia Type- general with ASA Monitors  Additional Monitors:   Airway Plan:           Plan Factors-    Chart reviewed  Patient summary reviewed  Patient is not a current smoker  Patient instructed to abstain from smoking on day of procedure  Patient did not smoke on day of surgery  Induction- intravenous  Postoperative Plan-     Informed Consent- Anesthetic plan and risks discussed with patient and spouse (Aniya Santana)

## 2022-01-12 NOTE — DISCHARGE INSTRUCTIONS
Drug Induced Sleep Endoscopy     WHAT YOU SHOULD KNOW:   During a drug induced sleep endsocopy (DISE), your caregiver places a scope into your nose to see inside your nose and throat  You may need a DISE to find the cause of your sleep apnea  DISE helps your caregiver diagnose your condition and create a treatment plan  You might also have surgery or other treatments during a DISE  AFTER YOU LEAVE:     Follow up with your primary healthcare provider or specialist as directed:  Write down your questions so you remember to ask them during your visits  Medicines:   · Keep a current list of your medicines:  Include the amounts, and when, how, and why you take them  Take the list or the pill bottles to follow-up visits  Carry your medicine list with you in case of an emergency  Throw away old medicine lists  Use vitamins, herbs, or food supplements only as directed  · Take your medicine as directed:  Call your healthcare provider if you think your medicine is not working as expected  Tell him about any medicine allergies, and if you want to quit taking or change your medicine  Nutrition:  Most people eat and drink as usual after a laryngoscopy  Ask your primary healthcare provider if you are not sure  Contact your primary healthcare provider if:  · You have problems breathing or talking  · You see new injuries to your teeth, lips, or tongue  · Your pain does not go away or gets worse  · You have questions about your procedure, medicine, or care  If you have any questions or concerns during business hours please call our office at [phone]   After hours please call the surgeon on call or Dr Ricky Maxwell at [phone]

## 2022-01-12 NOTE — OP NOTE
PERATIVE REPORT  PATIENT NAME: Mansoor Molina    :  1960  MRN: [id number]  Pt Location: [facility] OR ROOM 05    SURGERY DATE: 2022    Surgeon(s) and Role:     * Thresea Kussmaul, MD - Primary    Preop Diagnosis:  IVY (obstructive sleep apnea) [G47 33]    Post-Op Diagnosis Codes:     * IVY (obstructive sleep apnea) [G47 33]    Procedure(s) (LRB):  DRUG INDUCED SLEEP ENDOSCOPY (DISE) (N/A)    Specimen(s):  * No specimens in log *    Estimated Blood Loss:   Minimal    Drains:  * No LDAs found *    Anesthesia Type:   General    Operative Indications:  IVY (obstructive sleep apnea) [G47 33]      Operative Findings:  V 2 AP  O 2 AP  T 3 AP  E 3 AP    Complications:   None    Procedure and Technique:    PREOPERATIVE DIAGNOSES: Obstructive sleep apnea with positive pressure   intolerance and persistent sleep apnea after CPAP trial    POSTOPERATIVE DIAGNOSES: Same    PROCEDURES: Drug-induced sleep endoscopy (flexible fiberoptic laryngoscopy   with examination under anesthesia)  ANESTHESIA: IV sedation  ESTIMATED BLOOD LOSS: None  COMPLICATIONS: None  INDICATIONS: Mansoor Molina is a 57y o  year-old male with a history of symptomatic obstructive sleep apnea, who is intolerant and unable to achieve benefit with positive pressure therapy  He presents today for drug-induced sleep endoscopy to better characterize her locations and pattern of obstruction and to predict appropriate medical and/or surgical options moving forward  FINDINGS: There was no evidence of complete concentric palatal obstruction and he does appear to be a candidate anatomically for hypoglossal nerve   stimulation therapy  DESCRIPTION OF PROCEDURE: Mansoor Molina was brought to the operating room and was anesthetized via the standard drug-induced sleep endoscopy protocol  The propofol infusion rate was startedand gradually increased until conditions that mimic sleep were gradually observed       With the patient not responsive to verbal commands, but still with spontaneous respiration, sleep disordered breathing events and associated desaturations were clearly observed    Under these conditions, the flexible endoscope was inserted to examine both sides of the nose as well as the pharynx and larynx  The VOTE score at baseline was V: 2 partial AP; O: 2 partial AP; T: 3 complete AP; E: 3 complete AP  With simulated jaw advancement and tongue advancement, the hypopharyngeal obstruction and secondarily the palatal collapse also improved  In summary, there was no evidence of complete concentric palatal obstruction and he does appear to be a candidate anatomically for hypoglossal nerve stimulation therapy  The patient was then allowed to awaken while monitoring by anesthesia was performed until he was awake and able to maintain his own saturations  The patient was taken to the recovery area in stable condition  All instruments and sponge counts were correct at the end of the procedure  Merrill Boswell MD, was present for and performed all key elements of the procedure           I was present for the entire procedure and A qualified resident physician was not available    Patient Disposition:  PACU  and extubated and stable      SIGNATURE: Bonifacio Benavides MD  DATE: January 12, 2022  TIME: 8:25 AM

## 2022-01-12 NOTE — H&P
Review of Systems   Respiratory: Positive for apnea  All other systems reviewed and are negative  Consultation - Otolaryngology - Head and Neck Surgery  Facial Plastic and Reconstructive Surgery  Mansoor Molina 57 y o  male MRN: [id number]  Encounter: [id number]        Assessment/Plan:    Obstructive sleep apnea: We discussed the nature of obstructive sleep apnea  We discussed the natural history of sleep apnea  We discussed options for management  We discussed non-surgical management including weight loss, mandibular advancement devices, and positive airway pressure therapy including various options  We discussed that he is not tolerating his CPAP and has at least moderate IVY with an AHI of 17 and BMI of 31, he would like to move forward with Drug Induced Sleep Endoscopy to evaluate the source of the obstructions  We will plan for DISE and if necessary repeat sleep study if one has not been performed within 3 years  If surgical treatable causes of sleep apnea are seen such as tongue base laxity, we will consider options for surgical treatment  After the procedure we will further discuss surgical and non-surgical options, if necessary, at that time  History of Present Illness   Physician Requesting Consult: Juaquin Rudolph MD   Reason for Consult / Principal Problem: IVY  HPI: Mansoor Molina is a 57y o  year old male who presents with History of obstructive sleep apnea  He has struggled for years with his CPAP  He has tried multiple masks and has difficulty keeping the mask on in the middle of the night  He frequently removes the mask without his own knowledge  He presents today with a sleep study from 2012  He notes that his last sleep study was in 2015 but he does not have any of those records  No previous airway examination  No nasal obstruction  No nasal surgery  Review of systems:  10 Point ROS was performed and negative except as above or otherwise noted in the medical record      Historical Information   Past Medical History:   Diagnosis Date    Anxiety     Asthma     CPAP (continuous positive airway pressure) dependence     Depression     Diabetes mellitus (HCC)     Disease of thyroid gland     GERD (gastroesophageal reflux disease)     Hyperchloremia     Hyperlipidemia     Hypertension     Myocardial infarction ([address] )     Sleep apnea      Past Surgical History:   Procedure Laterality Date    CARDIAC SURGERY      3 stents    CARPAL TUNNEL RELEASE      COLONOSCOPY      HERNIA REPAIR      KNEE SURGERY      LUNG BIOPSY       Social History   Social History     Substance and Sexual Activity   Alcohol Use Yes    Comment:  once a month     Social History     Substance and Sexual Activity   Drug Use Never     Social History     Tobacco Use   Smoking Status Never Smoker   Smokeless Tobacco Never Used     Family History:   Family History   Problem Relation Age of Onset    Emphysema Father     Heart attack Mother     Lung cancer Brother     Diabetes Sister     Anxiety disorder Sister     No Known Problems Son        No current facility-administered medications on file prior to encounter       Current Outpatient Medications on File Prior to Encounter   Medication Sig    ascorbic acid (VITAMIN C) 500 MG tablet Take 500 mg by mouth daily    [facility]S ASPIRIN ADULT LOW DOSE 81 MG chewable tablet Chew 81 mg daily    DULoxetine (CYMBALTA) 30 mg delayed release capsule Take 30 mg by mouth daily      esomeprazole (NexIUM) 40 MG capsule Take 40 mg by mouth every morning before breakfast    levothyroxine 25 mcg tablet Take 25 mcg by mouth daily      lisinopril (ZESTRIL) 20 mg tablet Take 20 mg by mouth daily      metFORMIN (GLUCOPHAGE) 1000 MG tablet Take 1,000 mg by mouth 2 (two) times a day with meals    metoprolol tartrate (LOPRESSOR) 50 mg tablet Take 25 mg by mouth every 12 (twelve) hours      Omega-3 1000 MG CAPS Take 1,200 mg by mouth daily    prasugrel (EFFIENT) tablet Take 10 mg by mouth daily      rosuvastatin (CRESTOR) 20 MG tablet Take 20 mg by mouth daily      Silodosin 8 MG CAPS Take 1 capsule by mouth daily (Patient taking differently: Take 1 capsule by mouth every evening  )    tadalafil (CIALIS) 20 MG tablet Take 1 tablet (20 mg total) by mouth daily as needed for erectile dysfunction (no more than 2 doses weekly)    ALPRAZolam (XANAX) 0 5 mg tablet TAKE 1/2 1 TABLETS (0 25 0 5 MG TOTAL) BY MOUTH DAILY AS NEEDED  FOR ANXIETY    dutasteride (AVODART) 0 5 mg capsule Take 1 capsule (0 5 mg total) by mouth 3 (three) times a week       Allergies   Allergen Reactions    Omeprazole      Not effective     Sulfa Antibiotics Nausea Only    Amoxicillin Nausea Only    Atorvastatin Myalgia    Morphine Nausea Only       Vitals:    01/12/22 0642   BP: 150/76   Pulse: 58   Resp: 16   Temp: 99 1 °F (37 3 °C)   SpO2: 99%       Physical Exam   Constitutional: Oriented to person, place, and time  Well-developed and well-nourished, no apparent distress, non-toxic appearance  Cooperative, able to hear and answer questions without difficulty  Voice: Normal voice quality  Head: Normocephalic, atraumatic  No scars, masses or lesions  Face: Symmetric, no edema, no sinus tenderness  Eyes: Vision grossly intact, extra-ocular movement intact  Ears: External ears normal  Tympanic membranes intact with intact normal landmarks  No post-auricular erythema or tenderness  Nose: Septum intact, nares clear  Mucosa moist, turbinates well appearing  No crusting, polyps or discharge evident  Oral cavity: Dentition intact  Mucosa moist, lips without lesions or masses  Tongue mobile, floor of mouth soft and flat  Hard palate intact  No masses or lesions  Oropharynx: Uvula is midline, soft palate intact without lesion or mass  Oropharyngeal inlet without obstruction  Tonsils unremarkable  Posterior pharyngeal wall clear  No masses or lesions    Salivary glands:  Parotid glands and submandibular glands symmetric, no enlargement or tenderness  Neck: Normal laryngeal elevation with swallow  Trachea midline  No masses or lesions  No palpable adenopathy  Thyroid: Without tenderness or palpable nodules  Pulmonary/Chest: Normal effort and rate  No respiratory distress  No stertor or stridor  Musculoskeletal: Normal range of motion  Neurological: Cranial nerves 2-12 intact  Skin: Skin is warm and dry  Psychiatric: Normal mood and affect  CTAB  RRR  Abd soft NTND    Imaging Studies: I have personally reviewed pertinent reports  Lab Results: I have personally reviewed pertinent lab results        Records reviewed: Sleep study from 2012 showing AHI 17 0

## 2022-01-24 NOTE — PROGRESS NOTES
Assessment/Plan:    BPH with obstruction/lower urinary tract symptoms    AUA symptom score is 11 on Rapaflo  He is mixed about his voiding pattern  Cystoscopically there is a large median lobe that does not appear amenable to urolift  We discussed other treatment options including GreenLight laser as well as transurethral resection of the prostate  The pros and cons of each were discussed  At the conclusion were discussion the patient was leaning toward GreenLight laser however he wished to discuss his options further with his  Wife  He will call should he wish to proceed  GreenLight laser information was given to the patient  Diagnoses and all orders for this visit:    BPH with obstruction/lower urinary tract symptoms  -     POCT Measure PVR  -     POCT urine dip auto non-scope  -     Uroflow    Other orders  -     ALPRAZolam (XANAX) 0 5 mg tablet; TAKE 1/2 1 TABLETS (0 25 0 5 MG TOTAL) BY MOUTH DAILY AS NEEDED  FOR ANXIETY  -     [facility]S ASPIRIN ADULT LOW DOSE 81 MG chewable tablet; Chew 81 mg daily  -     metFORMIN (GLUCOPHAGE) 1000 MG tablet; Take 1,000 mg by mouth 2 (two) times a day with meals  -     Cystoscopy          Subjective:      Patient ID: Kristy Quinn is a 63 y o  male  Benign Prostatic Hypertrophy   This is a chronic problem  The current episode started more than 1 year ago  The problem is unchanged  Irritative symptoms do not include frequency, nocturia or urgency  Obstructive symptoms include dribbling, a slower stream and a weak stream  Obstructive symptoms do not include incomplete emptying, an intermittent stream or straining  Pertinent negatives include no chills, dysuria, genital pain, hematuria, hesitancy, nausea or vomiting  AUA score is 8-19  His sexual activity is non-contributory to the current illness  The symptoms are aggravated by caffeine and alcohol  Treatments tried: rapaflo  The treatment provided mild relief  He has been using treatment for 2 or more years     He tried Finasteride but it caused side effects and he discontinued the medication  The following portions of the patient's history were reviewed and updated as appropriate: allergies, current medications, past family history, past medical history, past social history, past surgical history and problem list     Review of Systems   Constitutional: Negative for chills, diaphoresis, fatigue and fever  HENT: Negative  Eyes: Negative  Respiratory: Negative  Cardiovascular: Negative  Gastrointestinal: Negative for nausea and vomiting  Endocrine: Negative  Genitourinary: Negative for dysuria, frequency, hematuria, hesitancy, incomplete emptying, nocturia and urgency  See HPI   Musculoskeletal: Negative  Skin: Negative  Allergic/Immunologic: Negative  Neurological: Negative  Hematological: Negative  Psychiatric/Behavioral: Negative  Objective:      /76   Pulse 71   Ht 5' 9" (1 753 m)   Wt 92 1 kg (203 lb)   BMI 29 98 kg/m²          Physical Exam   Constitutional: He is oriented to person, place, and time  He appears well-developed and well-nourished  HENT:   Head: Normocephalic and atraumatic  Eyes: Conjunctivae are normal    Neck: Neck supple  Cardiovascular: Normal rate  Pulmonary/Chest: Effort normal    Abdominal: Soft  Bowel sounds are normal  He exhibits no distension and no mass  There is no tenderness  There is no rebound, no guarding and no CVA tenderness  No hernia  Genitourinary: Rectum normal, testes normal and penis normal  Right testis shows no mass  Left testis shows no mass  No phimosis or hypospadias  Genitourinary Comments: Prostate 2+ enlarged and palpably benign   Musculoskeletal: He exhibits no edema  Neurological: He is alert and oriented to person, place, and time  Skin: Skin is warm and dry  Psychiatric: He has a normal mood and affect  His behavior is normal  Judgment and thought content normal    Vitals reviewed  Cystoscopy  Date/Time: 7/6/2020 11:08 AM  Performed by: Alfonso Colón MD  Authorized by: Alfonso Colón MD     Procedure details: cystoscopy    Patient tolerance: Patient tolerated the procedure well with no immediate complications    Additional Procedure Details: Cystoscopy Procedure Note        Pre-operative Diagnosis: Voiding dysfunction    Post-operative Diagnosis: Prostatic obstruction with large median lobe      Procedure Details   The risks, benefits, complications, treatment options, and expected outcomes were discussed with the patient  The patient concurred with the proposed plan, giving informed consent  Cystoscopy was performed today under local anesthesia, using sterile technique  The patient was placed in the supine position, prepped and draped in the usual sterile fashion  A 15 Portuguese flexible cystoscope  was used to inspect both the urethra and bladder  Findings:  Normal urethra, trilobar prostatic hypertrophy approximately 60 g with a very enlarged median lobe  Ureteral orifices are normal   Bladder is moderately trabeculated  There are no tumors or diverticula noted  Transrectal ultrasound of the prostate     transrectal ultrasound the prostate was performed with the 8 megahertz transrectal probe  The seminal vesicles are normal appearance  The prostate is enlarged measuring 75 g in size  There are no hypoechoic lesions noted in the peripheral zone  The transition zone is enlarged and heterogeneous in echotexture  Scattered cysts and calcifications are noted  There is a large median lobe  The remainder of the visualized bladder is unremarkable  The prostate measures 5 2 cm in height, 5 cm in width and 5 5 cm in length  Impression: Prostatic enlargement- enlarged median lobe  Uroflow with PVR      The patient voided 143 cc with a maximum flow of 2 9 mL/sec  The voiding pattern is prolonged period postvoid residuals 89 cc        Impression: Slow urinary stream with prolonged voiding time and low peak flow consistent with outlet obstruction   used

## 2022-02-02 ENCOUNTER — TELEPHONE (OUTPATIENT)
Dept: SLEEP CENTER | Facility: CLINIC | Age: 62
End: 2022-02-02

## 2022-02-02 NOTE — TELEPHONE ENCOUNTER
Left voice message for patient  Advised to call to schedule consult with one of our sleep specialists in the [place] office to be evaluated for Inspire  Phone number provided

## 2022-03-07 ENCOUNTER — TELEPHONE (OUTPATIENT)
Dept: UROLOGY | Facility: CLINIC | Age: 62
End: 2022-03-07

## 2022-03-07 NOTE — TELEPHONE ENCOUNTER
Patient left VM asking for CB to schedule follow up appointment with Dr Colan Landau  Please assist patient  Thank you

## 2022-03-08 NOTE — TELEPHONE ENCOUNTER
Called patient - he wishes to schedule an appointment with Dr Lin Brooks to schedule a GLL    Appointment scheduled for 03/21/22 @ 4

## 2022-03-14 ENCOUNTER — OFFICE VISIT (OUTPATIENT)
Dept: SLEEP CENTER | Facility: CLINIC | Age: 62
End: 2022-03-14
Payer: COMMERCIAL

## 2022-03-14 VITALS
SYSTOLIC BLOOD PRESSURE: 130 MMHG | WEIGHT: 208 LBS | HEART RATE: 61 BPM | BODY MASS INDEX: 31.52 KG/M2 | DIASTOLIC BLOOD PRESSURE: 60 MMHG | HEIGHT: 68 IN

## 2022-03-14 DIAGNOSIS — G47.09 OTHER INSOMNIA: ICD-10-CM

## 2022-03-14 DIAGNOSIS — G47.33 OSA (OBSTRUCTIVE SLEEP APNEA): Primary | ICD-10-CM

## 2022-03-14 DIAGNOSIS — G47.30 SLEEP APNEA: ICD-10-CM

## 2022-03-14 PROCEDURE — 99203 OFFICE O/P NEW LOW 30 MIN: CPT | Performed by: PSYCHIATRY & NEUROLOGY

## 2022-03-14 NOTE — ASSESSMENT & PLAN NOTE
The patient seems to be a good candidate for Inspire-his home sleep test showed moderate obstructive sleep apnea without significant central events and his DISE procedure did not show complete concentric palatal obstruction per Dr Miles Jorgensen notes  Although CPAP provides benefit, he would prefer to use an alternative treatment  His body mass index is well within the range for Inspire  We reviewed the  timeline after Andi Cuevas is implanted  We discussed that activation occurs approximately 1 month after implantation with an attended sleep study in the future  I will follow up with him after the procedure as noted for activation of the device

## 2022-03-14 NOTE — PROGRESS NOTES
Review of Systems      Genitourinary need to urinate more than twice a night   Cardiology palpitations/fluttering feeling in the chest   Gastrointestinal frequent heartburn/acid reflux and abdominal pain or cramping that disturb sleep    Neurology frequent headaches, need to move extremities and numbness/tingling of an extremity   Constitutional claustrophobia and fatigue   Integumentary none   Psychiatry anxiety and depression   Musculoskeletal joint pain, muscle aches and leg cramps   Pulmonary shortness of breath with activity, frequent cough, snoring and difficulty breathing when lying flat    ENT throat clearing and ringing in ears   Endocrine frequent urination   Hematological none

## 2022-03-14 NOTE — PROGRESS NOTES
Assessment/Plan:      1  IVY (obstructive sleep apnea)  Assessment & Plan:  The patient seems to be a good candidate for Inspire-his home sleep test showed moderate obstructive sleep apnea without significant central events and his DISE procedure did not show complete concentric palatal obstruction per Dr Juan Walker notes  Although CPAP provides benefit, he would prefer to use an alternative treatment  His body mass index is well within the range for Inspire  We reviewed the  timeline after Brie Spire is implanted  We discussed that activation occurs approximately 1 month after implantation with an attended sleep study in the future  I will follow up with him after the procedure as noted for activation of the device  2  Sleep apnea  -     Ambulatory Referral to Sleep Medicine    3  Other insomnia  Comments:  He described some insomnia at sleep onset but attributes this to his CPAP machine  Subjective:      Patient ID: Leon Ruby is a 57 y o  male  This is a 57year old male referred by Dr Christina Terry, prior to Brie Spire implantation  The patient has a history of IVY for over 10 years, reports a sleep study over 10 years ago in [place], [place]  He had presented with snoring and disrupted sleep  He uses CPAP but "hates it"  Symptomatically it helps, he has better sleep with it      2 night home sleep test- 10/2021- AHI 24 1 (3% desaturation for hypopnea), 16 7 with 4% hypopnea rule  No "unclassified apneas noted"    1/2022- DISE procedure- "FINDINGS: There was no evidence of complete concentric palatal obstruction and he does appear to be a candidate anatomically for hypoglossal nerve   stimulation therapy  "    The patient reports a bedtime of 1030 pm, is asleep by 1130 pm  He notes it takes an hour to fall asleep, room is dark and quiet  Uses white noise to block out the noise from his machine   Wakes 3-4 times a night with coughing or turning over  Wakes at 630 AM on his own    Estimates 5-6 hours of sleep a night  He is refreshed when he gets up  He feels sleepy in the day  Does not nap during the week, naps on the weekends  Does not doze     Reports loud snoring, gasping, witnessed apneas    No restless legs  Had sleepwalking as a child, not as an adult       PAP side effects- + rhinorrhea, + nose bleeds, + dry mouth, no aerophagia    Drinks 16 oz coffee a day, drinks diet coke later in the day           [place] Sleepiness Scale:     Sitting and reading: Slight chance of dozing  Watching TV: Moderate chance of dozing  Sitting, inactive in a public place (e g  a theatre or a meeting): Slight chance of dozing  As a passenger in a car for an hour without a break: Would never doze  Lying down to rest in the afternoon when circumstances permit: Moderate chance of dozing  Sitting and talking to someone: Would never doze  Sitting quietly after a lunch without alcohol: Slight chance of dozing  In a car, while stopped for a few minutes in traffic: Would never doze  Total score: 7     The following portions of the patient's history were reviewed and updated as appropriate: allergies, current medications, past family history, past medical history, past social history, past surgical history, and problem list     Review of Systems    Genitourinary need to urinate more than twice a night   Cardiology palpitations/fluttering feeling in the chest   Gastrointestinal frequent heartburn/acid reflux and abdominal pain or cramping that disturb sleep    Neurology frequent headaches, need to move extremities and numbness/tingling of an extremity   Constitutional claustrophobia and fatigue   Integumentary none   Psychiatry anxiety ,    Musculoskeletal joint pain, muscle aches and leg cramps   Pulmonary shortness of breath with activity, frequent cough, snoring and difficulty breathing when lying flat    ENT throat clearing and ringing in ears   Endocrine frequent urination   Hematological none       Objective:  Neck Circumference: 18 inches      /60 (BP Location: Left arm, Patient Position: Sitting, Cuff Size: Large)   Pulse 61   Ht 5' 8" (1 727 m)   Wt 94 3 kg (208 lb)   BMI 31 63 kg/m²          Physical Exam  Constitutional:       Appearance: Normal appearance  HENT:      Head: Normocephalic and atraumatic  Nose:      Comments: mallampati 4 airway, normal hard palate, small uvula, slight high arched hard palate     Mouth/Throat:      Mouth: Mucous membranes are moist    Eyes:      Extraocular Movements: Extraocular movements intact  Pupils: Pupils are equal, round, and reactive to light  Cardiovascular:      Rate and Rhythm: Normal rate  Pulses: Normal pulses  Heart sounds: Normal heart sounds  Pulmonary:      Effort: Pulmonary effort is normal       Breath sounds: Normal breath sounds  Musculoskeletal:      Right lower leg: No edema  Left lower leg: No edema  Neurological:      Mental Status: He is alert  Psychiatric:         Mood and Affect: Mood normal          Behavior: Behavior normal          Thought Content:  Thought content normal          Judgment: Judgment normal

## 2022-03-18 ENCOUNTER — TELEPHONE (OUTPATIENT)
Dept: UROLOGY | Facility: AMBULATORY SURGERY CENTER | Age: 62
End: 2022-03-18

## 2022-03-18 NOTE — TELEPHONE ENCOUNTER
First time with this situation,    I called the PCP office and someone on the referral line picked up  I told them that this patient is going to be seen on Monday, and the woman on the line said that she will make a note and someone will give the [phone] number a call back  The diagnosis code  Is N40 1, and N13 8 for the visit  The procedure code for the viit is 15692

## 2022-03-21 ENCOUNTER — OFFICE VISIT (OUTPATIENT)
Dept: UROLOGY | Facility: MEDICAL CENTER | Age: 62
End: 2022-03-21
Payer: COMMERCIAL

## 2022-03-21 VITALS
DIASTOLIC BLOOD PRESSURE: 78 MMHG | SYSTOLIC BLOOD PRESSURE: 114 MMHG | BODY MASS INDEX: 30.62 KG/M2 | HEIGHT: 68 IN | WEIGHT: 202 LBS

## 2022-03-21 DIAGNOSIS — N13.8 BPH WITH OBSTRUCTION/LOWER URINARY TRACT SYMPTOMS: Primary | ICD-10-CM

## 2022-03-21 DIAGNOSIS — N40.1 BPH WITH OBSTRUCTION/LOWER URINARY TRACT SYMPTOMS: Primary | ICD-10-CM

## 2022-03-21 DIAGNOSIS — E11.9 TYPE 2 DIABETES MELLITUS WITHOUT COMPLICATION, WITHOUT LONG-TERM CURRENT USE OF INSULIN (HCC): ICD-10-CM

## 2022-03-21 PROCEDURE — 99213 OFFICE O/P EST LOW 20 MIN: CPT | Performed by: UROLOGY

## 2022-03-21 NOTE — PATIENT INSTRUCTIONS
Enlarged Prostate (BPH)   WHAT YOU NEED TO KNOW:   An enlarged prostate (BPH) is a common condition in older adults  BPH develops because the number of prostate cells increases (hyperplasia) or the cells get bigger (hypertrophy)  The prostate wraps around the urethra  An enlarged prostate can press on the urethra  This may cause problems with storing urine or emptying your bladder completely  DISCHARGE INSTRUCTIONS:   Call your doctor or urologist if:   · You see blood in your urine  · You are not able to urinate  · Your bladder feels very full and painful  · You have new or worsening symptoms  · You have a fever  · You have questions or concerns about your condition or care  Medicines:   · Medicines  may be used to relax the muscles in your prostate and bladder  This may help you urinate more easily  You may also need medicine that helps shrink, or slow the growth of your prostate  · Take your medicine as directed  Contact your healthcare provider if you think your medicine is not helping or if you have side effects  Tell him or her if you are allergic to any medicine  Keep a list of the medicines, vitamins, and herbs you take  Include the amounts, and when and why you take them  Bring the list or the pill bottles to follow-up visits  Carry your medicine list with you in case of an emergency  What you can do to manage your symptoms:   · Urinate on a regular schedule  This will train your bladder to hold urine longer  A larger amount of urine may make it easier to urinate  · Drink less liquid during the day  Do not have liquid for several hours before you go to bed at night  Do not drink large amounts of any liquid at one time  · Limit alcohol and caffeine  These can irritate your bladder and make your symptoms worse  · Eat less salt  Salt can cause fluid buildup and make it harder to urinate  Examples of salty foods are chips, cured meats, and canned soups   Do not use table salt  · Elevate your legs if you have swelling  Elevate (raise) your legs above the level of your heart  This can relieve swelling caused by fluid buildup  You may not have to get up in the night to urinate  · Exercise regularly  Exercise can help improve your symptoms  Ask your healthcare provider what a healthy weight for you is  Aim to get at least 30 minutes of exercise on most days of the week  Follow up with your doctor or urologist as directed:  Write down your questions so you remember to ask them during your visits  © Copyright [employer] 2022 Information is for End User's use only and may not be sold, redistributed or otherwise used for commercial purposes  All illustrations and images included in CareNotes® are the copyrighted property of [employer]  or [employer]  The above information is an  only  It is not intended as medical advice for individual conditions or treatments  Talk to your doctor, nurse or pharmacist before following any medical regimen to see if it is safe and effective for you

## 2022-03-21 NOTE — ASSESSMENT & PLAN NOTE
AUA symptom score is presently 11  He is mixed about his voiding pattern  He recently restarted dutasteride  Options were discussed and he will continue combined medical therapy for the next several months  He will return in July and we will then consider whether he wishes to proceed with photoselective vaporization the prostate with the GreenLight laser  We will plan to check a PSA and urinalysis prior to his next visit

## 2022-03-21 NOTE — PROGRESS NOTES
Assessment/Plan:    BPH with obstruction/lower urinary tract symptoms  AUA symptom score is presently 11  He is mixed about his voiding pattern  He recently restarted dutasteride  Options were discussed and he will continue combined medical therapy for the next several months  He will return in July and we will then consider whether he wishes to proceed with photoselective vaporization the prostate with the GreenLight laser  We will plan to check a PSA and urinalysis prior to his next visit  Diagnoses and all orders for this visit:    BPH with obstruction/lower urinary tract symptoms  -     PSA Total, Diagnostic; Future  -     Urinalysis with microscopic; Future  -     PSA Total, Diagnostic  -     Urinalysis with microscopic    Type 2 diabetes mellitus without complication, without long-term current use of insulin ([facility])          Subjective:      Patient ID: Ilia Graf is a 57 y o  male  Benign Prostatic Hypertrophy  This is a chronic problem  The current episode started more than 1 year ago  The problem is unchanged  Irritative symptoms do not include frequency, nocturia or urgency  Obstructive symptoms include incomplete emptying and a slower stream  Obstructive symptoms do not include dribbling, an intermittent stream or straining  Pertinent negatives include no chills, dysuria, hematuria or hesitancy  AUA score is 8-19  His sexual activity is non-contributory to the current illness  The symptoms are aggravated by alcohol  Treatments tried: rapaflo and dutasteride  The treatment provided mild relief  He has been using treatment for 2 or more years  He will be having implantation of Inspire for sleep apnea in May      The following portions of the patient's history were reviewed and updated as appropriate: allergies, current medications, past family history, past medical history, past social history, past surgical history and problem list     Review of Systems   Constitutional: Negative for chills, diaphoresis, fatigue and fever  HENT: Negative  Eyes: Negative  Respiratory: Negative  Cardiovascular: Negative  Gastrointestinal: Negative  Endocrine: Negative  Genitourinary: Positive for incomplete emptying  Negative for dysuria, frequency, hematuria, hesitancy, nocturia and urgency  See HPI   Musculoskeletal: Negative  Skin: Negative  Allergic/Immunologic: Negative  Neurological: Negative  Hematological: Negative  Psychiatric/Behavioral: Negative  AUA SYMPTOM SCORE      Most Recent Value   AUA SYMPTOM SCORE    How often have you had a sensation of not emptying your bladder completely after you finished urinating? 2 (P)     How often have you had to urinate again less than two hours after you finished urinating? 1 (P)     How often have you found you stopped and started again several times when you urinate? 1 (P)     How often have you found it difficult to postpone urination? 1 (P)     How often have you had a weak urinary stream? 3 (P)     How often have you had to push or strain to begin urination? 2 (P)     How many times did you most typically get up to urinate from the time you went to bed at night until the time you got up in the morning? 1 (P)     Quality of Life: If you were to spend the rest of your life with your urinary condition just the way it is now, how would you feel about that? 3 (P)     AUA SYMPTOM SCORE 11 (P)       Objective:      /78   Ht 5' 8" (1 727 m)   Wt 91 6 kg (202 lb)   BMI 30 71 kg/m²          Physical Exam  Constitutional:       General: He is not in acute distress  Appearance: Normal appearance  He is well-developed and normal weight  He is not ill-appearing, toxic-appearing or diaphoretic  HENT:      Head: Normocephalic and atraumatic  Eyes:      General: No scleral icterus  Conjunctiva/sclera: Conjunctivae normal    Cardiovascular:      Rate and Rhythm: Normal rate     Pulmonary:      Effort: Pulmonary effort is normal    Abdominal:      General: There is no distension  Palpations: There is no mass  Tenderness: There is no abdominal tenderness  There is no right CVA tenderness, left CVA tenderness, guarding or rebound  Hernia: No hernia is present  Comments: No hernia   Genitourinary:     Penis: Normal     Musculoskeletal:      Cervical back: Neck supple  Skin:     General: Skin is warm and dry  Neurological:      General: No focal deficit present  Mental Status: He is alert and oriented to person, place, and time  Psychiatric:         Mood and Affect: Mood normal          Behavior: Behavior normal          Thought Content:  Thought content normal          Judgment: Judgment normal

## 2022-05-05 NOTE — PRE-PROCEDURE INSTRUCTIONS
Pre-Surgery Instructions:   Medication Instructions    ALPRAZolam (XANAX) 0 5 mg tablet Uses PRN- OK to take day of surgery    [facility] ASPIRIN ADULT LOW DOSE 81 MG chewable tablet will take after surgery    DULoxetine (CYMBALTA) 30 mg delayed release capsule Take day of surgery   dutasteride (AVODART) 0 5 mg capsule Take day of surgery   esomeprazole (NexIUM) 40 MG capsule Take day of surgery   levothyroxine 25 mcg tablet Take day of surgery   lisinopril (ZESTRIL) 20 mg tablet Hold day of surgery   metFORMIN (GLUCOPHAGE) 1000 MG tablet Hold day of surgery   metoprolol tartrate (LOPRESSOR) 50 mg tablet Take day of surgery   Omega-3 1000 MG CAPS stopped 5/4    prasugrel (EFFIENT) tablet stopped 5/4    rosuvastatin (CRESTOR) 20 MG tablet Take day of surgery   Silodosin 8 MG CAPS Take night before surgery    tadalafil (CIALIS) 20 MG tablet Uses PRN- DO NOT take day of surgery   Covid screening negative as per patient  Reviewed pre op medicine and showering instructions with patient via phone call, verbalizes understanding  Advised patient to not take non prescribed vitamins nor herbal meds as of 5/5  Advised to not take NSAID's 3 days pre op but may take Tylenol products if needed  Advised to take DOS medicine with a small sip water  Advised NPO after [place] prior to surgery and surgical services will call (5/10) with scheduled time of hospital arrival     Pt verbalized understanding of all instructions

## 2022-05-10 ENCOUNTER — ANESTHESIA EVENT (OUTPATIENT)
Dept: PERIOP | Facility: HOSPITAL | Age: 62
End: 2022-05-10
Payer: COMMERCIAL

## 2022-05-11 ENCOUNTER — APPOINTMENT (OUTPATIENT)
Dept: RADIOLOGY | Facility: HOSPITAL | Age: 62
End: 2022-05-11
Payer: COMMERCIAL

## 2022-05-11 ENCOUNTER — HOSPITAL ENCOUNTER (OUTPATIENT)
Facility: HOSPITAL | Age: 62
Setting detail: OUTPATIENT SURGERY
Discharge: HOME | End: 2022-05-11
Attending: OTOLARYNGOLOGY | Admitting: OTOLARYNGOLOGY
Payer: COMMERCIAL

## 2022-05-11 ENCOUNTER — ANESTHESIA (OUTPATIENT)
Dept: PERIOP | Facility: HOSPITAL | Age: 62
End: 2022-05-11
Payer: COMMERCIAL

## 2022-05-11 VITALS
WEIGHT: 200.84 LBS | DIASTOLIC BLOOD PRESSURE: 89 MMHG | BODY MASS INDEX: 30.54 KG/M2 | SYSTOLIC BLOOD PRESSURE: 162 MMHG | HEART RATE: 70 BPM | TEMPERATURE: 97.3 F | RESPIRATION RATE: 15 BRPM | OXYGEN SATURATION: 96 %

## 2022-05-11 DIAGNOSIS — G47.33 OSA (OBSTRUCTIVE SLEEP APNEA): Primary | ICD-10-CM

## 2022-05-11 DIAGNOSIS — G47.30 SLEEP APNEA: ICD-10-CM

## 2022-05-11 LAB
GLUCOSE SERPL-MCNC: 113 MG/DL (ref 65–140)
GLUCOSE SERPL-MCNC: 122 MG/DL (ref 65–140)

## 2022-05-11 PROCEDURE — C1787 PATIENT PROGR, NEUROSTIM: HCPCS | Performed by: OTOLARYNGOLOGY

## 2022-05-11 PROCEDURE — C1778 LEAD, NEUROSTIMULATOR: HCPCS | Performed by: OTOLARYNGOLOGY

## 2022-05-11 PROCEDURE — 70360 X-RAY EXAM OF NECK: CPT

## 2022-05-11 PROCEDURE — 71045 X-RAY EXAM CHEST 1 VIEW: CPT

## 2022-05-11 PROCEDURE — 82948 REAGENT STRIP/BLOOD GLUCOSE: CPT

## 2022-05-11 PROCEDURE — 64582 OPN MPLTJ HPGLSL NSTM ARY PG: CPT | Performed by: OTOLARYNGOLOGY

## 2022-05-11 PROCEDURE — C1767 GENERATOR, NEURO NON-RECHARG: HCPCS | Performed by: OTOLARYNGOLOGY

## 2022-05-11 PROCEDURE — 64568 OPN IMPLTJ CRNL NRV NEA&PG: CPT | Performed by: PHYSICIAN ASSISTANT

## 2022-05-11 DEVICE — LEAD SENSING RESP INSPIRE: Type: IMPLANTABLE DEVICE | Site: CHEST | Status: FUNCTIONAL

## 2022-05-11 DEVICE — IPG INSPIRE IV MODEL 3028: Type: IMPLANTABLE DEVICE | Site: CHEST | Status: FUNCTIONAL

## 2022-05-11 DEVICE — LEAD STIMULATION RESP INSPIRE: Type: IMPLANTABLE DEVICE | Site: NECK | Status: FUNCTIONAL

## 2022-05-11 RX ORDER — OXYCODONE HCL 5 MG/5 ML
5 SOLUTION, ORAL ORAL EVERY 4 HOURS PRN
Status: DISCONTINUED | OUTPATIENT
Start: 2022-05-11 | End: 2022-05-12 | Stop reason: HOSPADM

## 2022-05-11 RX ORDER — ONDANSETRON 2 MG/ML
INJECTION INTRAMUSCULAR; INTRAVENOUS AS NEEDED
Status: DISCONTINUED | OUTPATIENT
Start: 2022-05-11 | End: 2022-05-11

## 2022-05-11 RX ORDER — PROMETHAZINE HYDROCHLORIDE 25 MG/ML
12.5 INJECTION, SOLUTION INTRAMUSCULAR; INTRAVENOUS EVERY 6 HOURS PRN
Status: COMPLETED | OUTPATIENT
Start: 2022-05-11 | End: 2022-05-11

## 2022-05-11 RX ORDER — PROPOFOL 10 MG/ML
INJECTION, EMULSION INTRAVENOUS AS NEEDED
Status: DISCONTINUED | OUTPATIENT
Start: 2022-05-11 | End: 2022-05-11

## 2022-05-11 RX ORDER — LIDOCAINE HYDROCHLORIDE AND EPINEPHRINE 10; 10 MG/ML; UG/ML
INJECTION, SOLUTION INFILTRATION; PERINEURAL AS NEEDED
Status: DISCONTINUED | OUTPATIENT
Start: 2022-05-11 | End: 2022-05-11 | Stop reason: HOSPADM

## 2022-05-11 RX ORDER — MAGNESIUM HYDROXIDE 1200 MG/15ML
LIQUID ORAL AS NEEDED
Status: DISCONTINUED | OUTPATIENT
Start: 2022-05-11 | End: 2022-05-11 | Stop reason: HOSPADM

## 2022-05-11 RX ORDER — SUCCINYLCHOLINE/SOD CL,ISO/PF 100 MG/5ML
SYRINGE (ML) INTRAVENOUS AS NEEDED
Status: DISCONTINUED | OUTPATIENT
Start: 2022-05-11 | End: 2022-05-11

## 2022-05-11 RX ORDER — ONDANSETRON 2 MG/ML
4 INJECTION INTRAMUSCULAR; INTRAVENOUS ONCE AS NEEDED
Status: DISCONTINUED | OUTPATIENT
Start: 2022-05-11 | End: 2022-05-11 | Stop reason: HOSPADM

## 2022-05-11 RX ORDER — MIDAZOLAM HYDROCHLORIDE 2 MG/2ML
INJECTION, SOLUTION INTRAMUSCULAR; INTRAVENOUS AS NEEDED
Status: DISCONTINUED | OUTPATIENT
Start: 2022-05-11 | End: 2022-05-11

## 2022-05-11 RX ORDER — LIDOCAINE HYDROCHLORIDE 20 MG/ML
INJECTION, SOLUTION EPIDURAL; INFILTRATION; INTRACAUDAL; PERINEURAL AS NEEDED
Status: DISCONTINUED | OUTPATIENT
Start: 2022-05-11 | End: 2022-05-11

## 2022-05-11 RX ORDER — CEFAZOLIN SODIUM 2 G/50ML
2000 SOLUTION INTRAVENOUS ONCE
Status: COMPLETED | OUTPATIENT
Start: 2022-05-11 | End: 2022-05-11

## 2022-05-11 RX ORDER — FENTANYL CITRATE 50 UG/ML
INJECTION, SOLUTION INTRAMUSCULAR; INTRAVENOUS AS NEEDED
Status: DISCONTINUED | OUTPATIENT
Start: 2022-05-11 | End: 2022-05-11

## 2022-05-11 RX ORDER — SODIUM CHLORIDE 9 MG/ML
125 INJECTION, SOLUTION INTRAVENOUS CONTINUOUS
Status: DISCONTINUED | OUTPATIENT
Start: 2022-05-11 | End: 2022-05-12 | Stop reason: HOSPADM

## 2022-05-11 RX ORDER — ACETAMINOPHEN 160 MG/5ML
650 SUSPENSION, ORAL (FINAL DOSE FORM) ORAL ONCE
Status: DISCONTINUED | OUTPATIENT
Start: 2022-05-11 | End: 2022-05-12 | Stop reason: HOSPADM

## 2022-05-11 RX ORDER — FENTANYL CITRATE/PF 50 MCG/ML
50 SYRINGE (ML) INJECTION
Status: DISCONTINUED | OUTPATIENT
Start: 2022-05-11 | End: 2022-05-11 | Stop reason: HOSPADM

## 2022-05-11 RX ORDER — OXYCODONE HYDROCHLORIDE 5 MG/1
5 TABLET ORAL EVERY 4 HOURS PRN
Qty: 10 TABLET | Refills: 0 | Status: SHIPPED | OUTPATIENT
Start: 2022-05-11 | End: 2022-05-21

## 2022-05-11 RX ORDER — PROPOFOL 10 MG/ML
INJECTION, EMULSION INTRAVENOUS CONTINUOUS PRN
Status: DISCONTINUED | OUTPATIENT
Start: 2022-05-11 | End: 2022-05-11

## 2022-05-11 RX ADMIN — PROPOFOL 150 MCG/KG/MIN: 10 INJECTION, EMULSION INTRAVENOUS at 17:43

## 2022-05-11 RX ADMIN — FENTANYL CITRATE 100 MCG: 50 INJECTION INTRAMUSCULAR; INTRAVENOUS at 17:39

## 2022-05-11 RX ADMIN — ONDANSETRON 4 MG: 2 INJECTION INTRAMUSCULAR; INTRAVENOUS at 19:14

## 2022-05-11 RX ADMIN — Medication 100 MG: at 17:43

## 2022-05-11 RX ADMIN — PROMETHAZINE HYDROCHLORIDE 12.5 MG: 25 INJECTION INTRAMUSCULAR; INTRAVENOUS at 20:06

## 2022-05-11 RX ADMIN — FENTANYL CITRATE 50 MCG: 50 INJECTION, SOLUTION INTRAMUSCULAR; INTRAVENOUS at 20:23

## 2022-05-11 RX ADMIN — LIDOCAINE HYDROCHLORIDE 100 MG: 20 INJECTION, SOLUTION EPIDURAL; INFILTRATION; INTRACAUDAL; PERINEURAL at 17:43

## 2022-05-11 RX ADMIN — TRIMETHOBENZAMIDE HYDROCHLORIDE 200 MG: 100 INJECTION INTRAMUSCULAR at 20:12

## 2022-05-11 RX ADMIN — REMIFENTANIL HYDROCHLORIDE 0.1 MCG/KG/MIN: 1 INJECTION, POWDER, LYOPHILIZED, FOR SOLUTION INTRAVENOUS at 17:43

## 2022-05-11 RX ADMIN — ONDANSETRON 4 MG: 2 INJECTION INTRAMUSCULAR; INTRAVENOUS at 17:43

## 2022-05-11 RX ADMIN — MIDAZOLAM 2 MG: 1 INJECTION INTRAMUSCULAR; INTRAVENOUS at 17:39

## 2022-05-11 RX ADMIN — SODIUM CHLORIDE 125 ML/HR: 0.9 INJECTION, SOLUTION INTRAVENOUS at 13:00

## 2022-05-11 RX ADMIN — CEFAZOLIN SODIUM 2000 MG: 2 SOLUTION INTRAVENOUS at 17:31

## 2022-05-11 RX ADMIN — SODIUM CHLORIDE: 0.9 INJECTION, SOLUTION INTRAVENOUS at 17:35

## 2022-05-11 RX ADMIN — FENTANYL CITRATE 50 MCG: 50 INJECTION, SOLUTION INTRAMUSCULAR; INTRAVENOUS at 19:45

## 2022-05-11 RX ADMIN — PROPOFOL 200 MG: 10 INJECTION, EMULSION INTRAVENOUS at 17:43

## 2022-05-11 RX ADMIN — SODIUM CHLORIDE: 0.9 INJECTION, SOLUTION INTRAVENOUS at 19:14

## 2022-05-11 NOTE — ANESTHESIA PREPROCEDURE EVALUATION
Procedure:  INSERTION UPPER AIRWAY STIMULATOR (N/A Head)    Relevant Problems   CARDIO   (+) Hyperlipidemia   (+) Hypertension   (+) Myocardial infarction (HCC)      ENDO   (+) Type 2 diabetes mellitus without complication, without long-term current use of insulin (HCC)      GI/HEPATIC   (+) GERD (gastroesophageal reflux disease)      /RENAL   (+) BPH with obstruction/lower urinary tract symptoms      NEURO/PSYCH   (+) Anxiety   (+) Depression      PULMONARY   (+) Asthma   (+) IVY (obstructive sleep apnea)      Other   (+) History of coronary angioplasty with insertion of stent        Physical Exam    Airway    Mallampati score: III  TM Distance: >3 FB  Neck ROM: full     Dental   lower dentures and upper dentures,     Cardiovascular  Rhythm: regular, Rate: normal, Cardiovascular exam normal    Pulmonary  Pulmonary exam normal Breath sounds clear to auscultation,     Other Findings        Anesthesia Plan  ASA Score- 3     Anesthesia Type- general with ASA Monitors  Additional Monitors:   Airway Plan: LMA  Plan Factors-Exercise tolerance (METS): >4 METS  Chart reviewed  Existing labs reviewed  Patient is not a current smoker  Obstructive sleep apnea risk education given perioperatively  Induction- intravenous  Postoperative Plan-     Informed Consent- Anesthetic plan and risks discussed with patient

## 2022-05-11 NOTE — DISCHARGE INSTRUCTIONS
[facility] Hypoglossal Nerve Stimulator    Post-Operative Care  Office [phone]  Cell [phone]               At Home (in the days immediately following the procedure):   Try to sleep with your head elevated on 2-3 pillows   Iced packs placed over wound will help reduce swelling  They should be used 20 minutes on/ 20 minutes off while awake for the first full day  Crushed ice in ziplock bags or frozen peas or corn work well  The dressings may be removed 2 days after surgery  After the dressings are removed, the wounds should be cleaned with a Q-tip soaked in hydrogen peroxide mixed 50/50 with water  Apply antibiotic ointment (Bacitracin) or Vaseline to the external incisions 3-4 times per day  Take your medicines as prescribed  REMEMBER:  DO NOT DRIVE WHILE TAKING PAIN MEDICATIONS  You should do neck rolls 10 times clockwise and 10 times counterclockwise directions for 1 week after surgery  You may shower with luke-warm water only after the dressings are removed  You may use ibuprofen (Motrin, Advil) and acetaminophen (Tyelnol) for pain control in addition to any prescribed pain medications  You may get out of bed and go to the bathroom with assistance  Eat light, soft meals as tolerated, avoiding gas-stimulating foods  Follow-up care:   Eat before coming to the office for post-operative visits  Rest for the first week after the procedure, avoiding excessive physical activities, hard chewing, lifting objects over 8 lbs (about the weight of a phone book), or bending over  We request that you do not travel by plane for one week after surgery  Clean the wound at least twice daily using 1/2 hydrogen peroxide 1/2 water solution to remove any crusting (scabbing)  Lubricate your wound after cleaning with Q-tips and antibiotic ointment to soften hardened crusts  Follow-up visits:     At one week, the sutures will be removed; you may drive yourself to this appointment (as long as you are no longer taking prescription/narcotic pain medicines)  After dressing removal, make-up may be worn, avoiding the incision lines  Additional follow-up visits will be scheduled at this time  Note that final results from the incisions may not be apparent until [place] after surgery  Healing Care:   After surgery try not to roll onto the wound while asleep  Clean the external skin gently but thoroughly with soap  The use of alcohol and tobacco products prolong swelling and healing and are best avoided for 2 weeks after surgery  Do not expose your wound to sun for 4-6 weeks after surgery  Use sunscreen (SPF 30 or higher) for 6 months after surgery if sun exposure is absolutely necessary  Avoid any physical exercise that can cause over-heating or over-exertion for two weeks after the surgery  Your nose may be swollen and stuffy for several months  Complete healing may take 12 months  It is to your advantage to return for all postoperative visits so that long-term results may be evaluated  Frequently Asked Questions:  When can I shower and shampoo my hair? You may shower the day after your surgery, BUT KEEP ANY DRESSING DRY  This may mean you wash your face/hair in the sink instead  It is important that you do not use hot water, as this can increase the swelling  Lukewarm water is best       When will the swelling and bruising go away? This usually takes 7-10 days or so, but may take less or more time, depending on the individual     When can I take aspirin? You should not take aspirin for 2 weeks prior to or after surgery  The same is true for vitamin E, ginko, garlic pills, and other natural supplements  When can I take ibuprofen? Non-steroidal anti-inflammatory drugs such as advil (ibuprofen), alleve (naproxen), or other similar may be used immediately after surgery as per the guidelines on the package  When can I wear my glasses?    You will be able to wear contact lenses as soon as you feel comfortable  You may wear glasses one to two weeks after surgery, depending on the type of surgery you had  In any case, you must be careful not to place any pressure on the wound  When can I wear makeup? Make-up can be applied after suture removal, but not directly on the incisions until 3 weeks after the procedure  When will I activate my device? We will wait for your healing to finish prior to activation which usually means a few weeks  The plan will be set up at your first follow up appointment at 1 week after the procedure  What about exercise? Please adhere to the following schedule:   Up to week 1 after surgery:  REST! No strenuous exercise  Walking is ok  Week 1-3 after surgery: You may begin light aerobic exercise, but no bending over/straining/lifting weights  You may begin some range of motion exercises of your shoulder  Week 3+: You may begin more strenuous exercise, such as yoga, stretching, bending over, lifting weights  Please remember to start slowly  Week 6+: You may resume contact sports, such as soccer, basketball, etc    POST-OPERATIVE APPOINTMENTS:  1 week:  wound check in the office  1 month: device activation and wound check in the office  3 months: device titration sleep study at [address] [place] [address]  4 months: final wound check in the office  Yearly: device check at office  How to contact us:    Phone: If you have questions or concerns, please call us at [phone] during business hours (8 am to 5 pm)  On nights and weekends, you may page the ENT surgeon on call  at [facility]   In case of emergency, please call [phone]

## 2022-05-11 NOTE — H&P
Ivette Vick is a 57 y o male who presents for re-evaluation of sleep apnea  Had sleep study 10/11/21 showing AHI of 16 7 using 4% criteria  Failed CPAP trial previously x 2  No nasal obstruction  Planned for Inspire  Past Medical History:   Diagnosis Date    Allergies     Anxiety     Arthritis     Asthma     BPH (benign prostatic hyperplasia)     CPAP (continuous positive airway pressure) dependence     Depression     Diabetes mellitus (HCC)     Disease of thyroid gland     GERD (gastroesophageal reflux disease)     Hyperchloremia     Hyperlipidemia     Hypertension     Myocardial infarction (HCC)     PONV (postoperative nausea and vomiting)     Sarcoidosis     Sleep apnea        /78   Pulse 63   Temp 98 3 °F (36 8 °C) (Temporal)   Resp 18   Wt 91 1 kg (200 lb 13 4 oz)   SpO2 98%   BMI 30 54 kg/m²       Physical Exam   Constitutional: Oriented to person, place, and time  Well-developed and well-nourished, no apparent distress, non-toxic appearance  Cooperative, able to hear and answer questions without difficulty  Voice: Normal voice quality  Head: Normocephalic, atraumatic  No scars, masses or lesions  Face: Symmetric, no edema, no sinus tenderness  Eyes: Vision grossly intact, extra-ocular movement intact  Ears: External ear normal   Bilateral tympanic membranes are intact with intact normal landmarks  No post-auricular erythema or tenderness  Nose: Septum midline, nares clear  Mucosa moist, turbinates well appearing  No crusting, polyps or discharge evident  Oral cavity: Dentition intact  Mucosa moist, lips normal   Tongue mobile, floor of mouth normal   Hard palate unremarkable  No masses or lesions  Oropharynx: Uvula is midline, soft palate normal   Unremarkable oropharyngeal inlet  Tonsils unremarkable  Posterior pharyngeal wall clear  No masses or lesions    Salivary glands:  Parotid glands and submandibular glands symmetric, no enlargement or tenderness  Neck: Normal laryngeal elevation with swallow  Trachea midline  No masses or lesions  No palpable adenopathy  Thyroid: normal in size, unremarkable without tenderness or palpable nodules  Pulmonary/Chest: Normal effort and rate  No respiratory distress  Musculoskeletal: Normal range of motion  Neurological: Cranial nerves 2-12 intact  Skin: Skin is warm and dry  Psychiatric: Normal mood and affect  A/P: Obstructive sleep apnea: We discussed the nature of obstructive sleep apnea  We discussed the natural history of sleep apnea  We discussed options for management  We discussed non-surgical management including weight loss, mandibular advancement devices, and positive airway pressure therapy including various options  We discussed that he is not tolerating his CPAP and has at least moderate IVY with an AHI of 16 7 and BMI of 31, he would like to move forward with Inspire hypoglossal nerve stimulator  Risks, benefits, and alternatives were discussed

## 2022-05-11 NOTE — OP NOTE
OPERATIVE REPORT  PATIENT NAME: Jesus Isbell    :  1960  MRN: [id number]  Pt Location: [facility] OR ROOM 05    SURGERY DATE: 2022    Surgeon(s) and Role:     * Colin Murry MD - Primary     * Loyde Hodgkin, PA-C - Assisting    Preop Diagnosis:  IVY (obstructive sleep apnea) [G47 33]    Post-Op Diagnosis Codes:       * IVY (obstructive sleep apnea) [G47 33]    Procedure(s) (LRB):  INSERTION UPPER AIRWAY STIMULATOR (N/A)    Specimen(s):  * No specimens in log *    Estimated Blood Loss:   Minimal    Drains:  * No LDAs found *    Anesthesia Type:   General    Operative Indications:  BMI 30  IVY (obstructive sleep apnea) [G47 33]    Operative Findings:  Good stimulation at 0 5 V no retraction at 0 3 V    Complications:   None    Procedure and Technique:  Indications for procedure: Jesus Isbell is a 57 y o  male with a history of Moderate to Severe obstructive sleep apnea, who is intolerant and unable to achieve benefit from positive pressure therapy  Patient has passed the clinical, polysomnographic, and endoscopic screening criteria and presents today for the implant, whom I have seen in consultation for the above-listed procedure  After discussion of risks, benefits, and alternatives the patient elected to undergo the procedure and informed consent was obtained  Procedure in detail: The patient was brought back to the operating room laid in the supine position and general endotracheal anesthesia was administered  The patient was positioned appropriately  Appropriate time-out was taken and procedure, sidedness, and marking was confirmed  5 mL of 1% lidocaine with 1:100,000 epinephrine was infiltrated into the marked areas  Prior to prepping and draping, electrodes were placed in the genioglossus and styloglossus muscle and connected to the NIM box for intraoperative nerve monitoring  The patient was prepped and draped in standard fashion  Neuroplasty was performed as follows:  The lateral branches to retrusor muscles were identified, and tested intra-operatively using the NIM stimulator  The branches were identified and the inclusion branches were stimulated with both visual and neurostimulator confirmation  The branches were dissected in 360 degrees for 1 5 cm around the TV and C1 branches with care not to include the HG branches  Insertion of an upper airway stimulator was performed as follows: The cuff electrode for the hypoglossal nerve stimulator was placed distally to these branches on the medial nerve branch to the genioglossus muscle  Diagnostic evaluation confirmed activation of the genioglossus nerve, resulting in genioglossal activation and tongue protrusion, confirmed visually  The stimulation electrode was then looped under and secured to the digastric tendon on its lateral surface with the provided anchor  Insertion of a thoracic sensor lead was performed as follows:  A second 5 cm incision was made in the right upper chest approximately 3 cm below the clavicle  Dissection was carried down to the pectoralis muscle  An inferior pocket was created deep to the subcutaneous layer and superficial to the pectoralis muscle  Dissection was carried down through pectoralis muscle using blunt dissection  The interspace between the 2nd and 3rd ribs were exposed  The external oblique muscles were identified, and bluntly dissected, and a tunnel was created between the external and internal intercostals in the 2nd intercostal space just on the superior aspect of the third rib  The pleural respiration sensor was placed into the pocket in the inferior aspect of the intercostal space  The sensor was sutured to the fascia using the provided anchors to maintain the sensor facing the pleural space  The stimulation lead was then tunneled in a subplatysmal plane and brought out into the sub-clavicular pocket  Insertion of an upper airway stimulator was continued as follows:  Both the cuff electrode and the respiration sensing lead were connected to the implantable pulse generator  Diagnostic evaluation was run, which confirmed a good respiration sensing signal as well as good tongue protrusion stimulation  The implantable pulse generator was placed in the subclavicular pocket and secured loosely to the pectoralis fascia using 2-0 silk sutures  All the wounds were thoroughly irrigated with irrigation  The wounds were then closed in three layers with deep layers closed with 3-0 Vicryl and the skin closed with 4-0 Monocryl  Wound dressings were placed  The patient was returned to the care of Anesthesia, extubated without difficulty, and taken to the recovery area in stable condition  All instruments and sponge counts were correct at the end of the procedure  Pilo Zabala MD, was present for and performed all key elements of the procedure       I was present for the entire procedure, A qualified resident physician was not available and A physician assistant was required during the procedure for retraction tissue handling,dissection and suturing    Patient Disposition:  PACU  and extubated and stable      SIGNATURE: Poonam Kahn MD  DATE: May 11, 2022  TIME: 7:28 PM

## 2022-05-12 NOTE — ANESTHESIA POSTPROCEDURE EVALUATION
Post-Op Assessment Note    CV Status:  Stable    Pain management: adequate     Mental Status:  Awake   Hydration Status:  Stable   PONV Controlled:  None   Airway Patency:  Patent      Post Op Vitals Reviewed: Yes      Staff: Anesthesiologist         No complications documented      BP      Temp     Pulse     Resp      SpO2      /89   Pulse 70   Temp (!) 97 3 °F (36 3 °C)   Resp 15   Wt 91 1 kg (200 lb 13 4 oz)   SpO2 96%   BMI 30 54 kg/m²

## 2022-06-13 ENCOUNTER — OFFICE VISIT (OUTPATIENT)
Dept: SLEEP CENTER | Facility: CLINIC | Age: 62
End: 2022-06-13
Payer: COMMERCIAL

## 2022-06-13 VITALS
SYSTOLIC BLOOD PRESSURE: 120 MMHG | WEIGHT: 204.8 LBS | BODY MASS INDEX: 31.14 KG/M2 | OXYGEN SATURATION: 96 % | DIASTOLIC BLOOD PRESSURE: 70 MMHG | HEART RATE: 87 BPM

## 2022-06-13 DIAGNOSIS — G47.30 SLEEP APNEA, UNSPECIFIED: ICD-10-CM

## 2022-06-13 DIAGNOSIS — Z96.82 S/P INSERTION OF HYPOGLOSSAL NERVE STIMULATOR: Primary | ICD-10-CM

## 2022-06-13 DIAGNOSIS — G47.09 OTHER INSOMNIA: ICD-10-CM

## 2022-06-13 DIAGNOSIS — G47.33 OBSTRUCTIVE SLEEP APNEA (ADULT) (PEDIATRIC): ICD-10-CM

## 2022-06-13 PROCEDURE — 95976 ALYS SMPL CN NPGT PRGRMG: CPT | Performed by: PSYCHIATRY & NEUROLOGY

## 2022-06-13 PROCEDURE — 99213 OFFICE O/P EST LOW 20 MIN: CPT | Performed by: PSYCHIATRY & NEUROLOGY

## 2022-06-13 NOTE — PROGRESS NOTES
Review of Systems      Genitourinary none   Cardiology none   Gastrointestinal frequent heartburn/acid reflux   Neurology none   Constitutional claustrophobia   Integumentary none   Psychiatry anxiety   Musculoskeletal joint pain   Pulmonary none   ENT none   Endocrine none   Hematological none

## 2022-06-13 NOTE — PROGRESS NOTES
Assessment/Plan:      1  S/P insertion of hypoglossal nerve stimulator    2  Obstructive sleep apnea (adult) (pediatric)  Assessment & Plan:  He has a history of moderate obstructive sleep apnea, he tolerated activation of the inspire device well  I recommended that he increase his settings by 1 step each week  We discussed in detail that the device is not always immediately effective, it may take adjustment of settings before reaching an optimal setting  Therefore, in the initial stages it is possible his symptoms will worsen as he discontinue CPAP in 1st uses Inspire  We discussed in detail the need to avoid drowsy driving  He understands this  I will see him back in follow-up in 2 months and we will schedule a sleep study with the inspire device in 3 months  He was instructed on proper use of the inspire remote today and also provided phone numbers for contact with any clinical questions  Orders:  -     Ambulatory Referral to Sleep Medicine  -     Diagnostic Sleep Study with Inspire; Future; Expected date: 09/13/2022    3  Sleep apnea, unspecified  -     Ambulatory Referral to Sleep Medicine    4  Other insomnia  -     Ambulatory Referral to Sleep Medicine      settings applied today:   functional voltage- 0 9 V, minimum 0 7- maximum 1 7  Start delay 30 min   Pause time 15 minutes   Total hours of therapy- 9 hr       Subjective:      Patient ID: Milana Flowers is a 60 y o  male  Mr David Fink turns in follow-up for activation of his inspire device  His device was implanted May 11, 2022 by Dr Cory Bradley  Since implantation, he has not had any problems  with tongue movement, no chewing speaking, or swallowing problems  He has been going to bed 1030 pm, falls asleep in 30 min, wakes 630 AM   Sleeps through the night   Has been using CPAP nightly, can wear it up to 8 hours       We discussed prior to starting CPAP, he had residual daytime sleepiness, did not have drowsy driving    Hobbs Sleepiness Scale: Sitting and reading: Slight chance of dozing  Watching TV: Slight chance of dozing  Sitting, inactive in a public place (e g  a theatre or a meeting): Slight chance of dozing  As a passenger in a car for an hour without a break: Slight chance of dozing  Lying down to rest in the afternoon when circumstances permit: Slight chance of dozing  Sitting and talking to someone: Slight chance of dozing  Sitting quietly after a lunch without alcohol: Slight chance of dozing  In a car, while stopped for a few minutes in traffic: Would never doze  Total score: 7     The following portions of the patient's history were reviewed and updated as appropriate: allergies, current medications, past family history, past medical history, past social history, past surgical history, and problem list     Review of Systems       Genitourinary none   Cardiology none   Gastrointestinal frequent heartburn/acid reflux   Neurology none   Constitutional claustrophobia   Integumentary none   Psychiatry anxiety   Musculoskeletal joint pain   Pulmonary none   ENT none   Endocrine none   Hematological none        Objective:        /70   Pulse 87   Wt 92 9 kg (204 lb 12 8 oz)   SpO2 96%   BMI 31 14 kg/m²      On exam he has midline tongue protrusion with full range of motion of the tongue  The surgical scar looks clean        Data reviewed-discharge note, notes from Dr Trenton Mcbride and Lelia Sr

## 2022-06-13 NOTE — PATIENT INSTRUCTIONS

## 2022-06-13 NOTE — ASSESSMENT & PLAN NOTE
He has a history of moderate obstructive sleep apnea, he tolerated activation of the inspire device well  I recommended that he increase his settings by 1 step each week  We discussed in detail that the device is not always immediately effective, it may take adjustment of settings before reaching an optimal setting  Therefore, in the initial stages it is possible his symptoms will worsen as he discontinue CPAP in 1st uses Inspire  We discussed in detail the need to avoid drowsy driving  He understands this  I will see him back in follow-up in 2 months and we will schedule a sleep study with the inspire device in 3 months  He was instructed on proper use of the inspire remote today and also provided phone numbers for contact with any clinical questions

## 2022-06-14 ENCOUNTER — TELEPHONE (OUTPATIENT)
Dept: SLEEP CENTER | Facility: CLINIC | Age: 62
End: 2022-06-14

## 2022-06-14 NOTE — TELEPHONE ENCOUNTER
I received a phone call from Aditi from [facility] patient started therapy at level 5 last night instead of level 3 as he did not feel anything level 3  I called the patient to review this with him  He notes that at level 3 he did not feel any sensation and had light snoring so he increased the setting to 5 on his own  We discussed that there may have been miscommunication-I advised that he should start at level 3 and increase by 1 step each week  He understood the above

## 2022-08-06 LAB
APPEARANCE UR: CLEAR
BACTERIA URNS QL MICRO: NORMAL
BILIRUB UR QL STRIP: NEGATIVE
CASTS URNS QL MICRO: NORMAL /LPF
COLOR UR: YELLOW
EPI CELLS #/AREA URNS HPF: NORMAL /HPF (ref 0–10)
GLUCOSE UR QL: NEGATIVE
HGB UR QL STRIP: NEGATIVE
KETONES UR QL STRIP: NEGATIVE
LEUKOCYTE ESTERASE UR QL STRIP: NEGATIVE
MICRO URNS: ABNORMAL
NITRITE UR QL STRIP: NEGATIVE
PH UR STRIP: 5.5 [PH] (ref 5–7.5)
PROT UR QL STRIP: ABNORMAL
PSA SERPL-MCNC: 1 NG/ML (ref 0–4)
RBC #/AREA URNS HPF: NORMAL /HPF (ref 0–2)
SP GR UR: 1.02 (ref 1–1.03)
UROBILINOGEN UR STRIP-ACNC: 0.2 MG/DL (ref 0.2–1)
WBC #/AREA URNS HPF: NORMAL /HPF (ref 0–5)

## 2022-08-14 DIAGNOSIS — N40.0 BPH WITHOUT OBSTRUCTION/LOWER URINARY TRACT SYMPTOMS: ICD-10-CM

## 2022-08-15 RX ORDER — SILODOSIN 8 MG/1
CAPSULE ORAL
Qty: 90 CAPSULE | Refills: 3 | Status: SHIPPED | OUTPATIENT
Start: 2022-08-15

## 2022-09-01 ENCOUNTER — OFFICE VISIT (OUTPATIENT)
Dept: UROLOGY | Facility: MEDICAL CENTER | Age: 62
End: 2022-09-01
Payer: COMMERCIAL

## 2022-09-01 VITALS
WEIGHT: 198 LBS | OXYGEN SATURATION: 97 % | HEIGHT: 68 IN | DIASTOLIC BLOOD PRESSURE: 78 MMHG | HEART RATE: 70 BPM | BODY MASS INDEX: 30.01 KG/M2 | SYSTOLIC BLOOD PRESSURE: 118 MMHG

## 2022-09-01 DIAGNOSIS — N13.8 BPH WITH OBSTRUCTION/LOWER URINARY TRACT SYMPTOMS: Primary | ICD-10-CM

## 2022-09-01 DIAGNOSIS — N40.1 BPH WITH OBSTRUCTION/LOWER URINARY TRACT SYMPTOMS: Primary | ICD-10-CM

## 2022-09-01 PROCEDURE — 99214 OFFICE O/P EST MOD 30 MIN: CPT | Performed by: UROLOGY

## 2022-09-01 RX ORDER — DUTASTERIDE 0.5 MG/1
0.5 CAPSULE, LIQUID FILLED ORAL 3 TIMES WEEKLY
Qty: 60 CAPSULE | Refills: 3 | Status: SHIPPED | OUTPATIENT
Start: 2022-09-02

## 2022-09-01 NOTE — PROGRESS NOTES
Assessment/Plan:    BPH with obstruction/lower urinary tract symptoms  AUA symptom score is 9 on combined medical therapy  At this point he satisfied with his voiding pattern  PSA was 1 0 and urinalysis negative on August 5, 2022  We have previously discussed photoselective vaporization the prostate with the GreenLight laser  At this point he is content to remain on medical therapy  We will plan to continue following min will repeat his PSA and urinalysis in 1 year  He will return in 1 year for follow-up  He will call should problems arise or should he have a change of heart and desire additional therapy  Diagnoses and all orders for this visit:    BPH with obstruction/lower urinary tract symptoms  -     PSA Total, Diagnostic; Future  -     Urinalysis with microscopic; Future  -     PSA Total, Diagnostic  -     Urinalysis with microscopic  -     dutasteride (AVODART) 0 5 mg capsule; Take 1 capsule (0 5 mg total) by mouth 3 (three) times a week          Subjective:      Patient ID: Madeline Galicia is a 60 y o  male  Benign Prostatic Hypertrophy  This is a chronic problem  The current episode started more than 1 year ago  The problem has been gradually improving since onset  Irritative symptoms do not include frequency, nocturia (nocturia x 1) or urgency (rare)  Obstructive symptoms include a slower stream  Obstructive symptoms do not include dribbling, incomplete emptying, an intermittent stream or straining  Associated symptoms include hesitancy  Pertinent negatives include no chills, dysuria, hematuria or nausea  AUA score is 8-19  His sexual activity is non-contributory to the current illness  Past treatments include dutasteride (rapaflo)  The treatment provided moderate relief  He has been using treatment for 1 to 2 years         The following portions of the patient's history were reviewed and updated as appropriate: allergies, current medications, past family history, past medical history, past social history, past surgical history and problem list     Review of Systems   Constitutional: Negative  Negative for chills, diaphoresis, fatigue and fever  HENT: Negative  Eyes: Negative  Respiratory: Negative  Cardiovascular: Negative  Gastrointestinal: Negative  Negative for nausea  Endocrine: Negative  Genitourinary: Positive for hesitancy  Negative for dysuria, frequency, hematuria, incomplete emptying, nocturia (nocturia x 1) and urgency (rare)  See HPI   Musculoskeletal: Negative  Skin: Negative  Allergic/Immunologic: Negative  Neurological: Negative  Hematological: Negative  Psychiatric/Behavioral: Negative  AUA SYMPTOM SCORE    Flowsheet Row Most Recent Value   AUA SYMPTOM SCORE    How often have you had a sensation of not emptying your bladder completely after you finished urinating? 1 (P)     How often have you had to urinate again less than two hours after you finished urinating? 1 (P)     How often have you found you stopped and started again several times when you urinate? 1 (P)     How often have you found it difficult to postpone urination? 1 (P)     How often have you had a weak urinary stream? 2 (P)     How often have you had to push or strain to begin urination? 2 (P)     How many times did you most typically get up to urinate from the time you went to bed at night until the time you got up in the morning? 1 (P)     Quality of Life: If you were to spend the rest of your life with your urinary condition just the way it is now, how would you feel about that? 3 (P)     AUA SYMPTOM SCORE 9 (P)         Objective:      /78   Pulse 70   Ht 5' 8" (1 727 m)   Wt 89 8 kg (198 lb)   SpO2 97%   BMI 30 11 kg/m²          Physical Exam  Vitals reviewed  Constitutional:       General: He is not in acute distress  Appearance: Normal appearance  He is well-developed and normal weight  He is not ill-appearing, toxic-appearing or diaphoretic     HENT: Head: Normocephalic and atraumatic  Eyes:      General: No scleral icterus  Conjunctiva/sclera: Conjunctivae normal    Cardiovascular:      Rate and Rhythm: Normal rate  Pulmonary:      Effort: Pulmonary effort is normal    Abdominal:      General: Bowel sounds are normal  There is no distension  Palpations: Abdomen is soft  There is no mass  Tenderness: There is no abdominal tenderness  There is no right CVA tenderness, left CVA tenderness, guarding or rebound  Hernia: No hernia is present  Genitourinary:     Penis: Normal  No phimosis or hypospadias  Testes: Normal          Right: Mass not present  Left: Mass not present  Rectum: Normal       Comments: Prostate 1 5 x enlarged - palpably benign  Musculoskeletal:         General: Normal range of motion  Cervical back: Normal range of motion and neck supple  Skin:     General: Skin is warm and dry  Neurological:      General: No focal deficit present  Mental Status: He is alert and oriented to person, place, and time  Psychiatric:         Mood and Affect: Mood normal          Behavior: Behavior normal          Thought Content:  Thought content normal          Judgment: Judgment normal

## 2022-09-01 NOTE — ASSESSMENT & PLAN NOTE
AUA symptom score is 9 on combined medical therapy  At this point he satisfied with his voiding pattern  PSA was 1 0 and urinalysis negative on August 5, 2022  We have previously discussed photoselective vaporization the prostate with the GreenLight laser  At this point he is content to remain on medical therapy  We will plan to continue following min will repeat his PSA and urinalysis in 1 year  He will return in 1 year for follow-up  He will call should problems arise or should he have a change of heart and desire additional therapy

## 2022-09-26 ENCOUNTER — OFFICE VISIT (OUTPATIENT)
Dept: SLEEP CENTER | Facility: CLINIC | Age: 62
End: 2022-09-26
Payer: COMMERCIAL

## 2022-09-26 VITALS
DIASTOLIC BLOOD PRESSURE: 60 MMHG | BODY MASS INDEX: 30.01 KG/M2 | HEIGHT: 68 IN | HEART RATE: 75 BPM | WEIGHT: 198 LBS | SYSTOLIC BLOOD PRESSURE: 132 MMHG

## 2022-09-26 DIAGNOSIS — Z96.82 S/P INSERTION OF HYPOGLOSSAL NERVE STIMULATOR: ICD-10-CM

## 2022-09-26 DIAGNOSIS — G47.33 OBSTRUCTIVE SLEEP APNEA (ADULT) (PEDIATRIC): Primary | ICD-10-CM

## 2022-09-26 DIAGNOSIS — E66.9 OBESITY (BMI 30-39.9): ICD-10-CM

## 2022-09-26 DIAGNOSIS — G47.30 SLEEP APNEA, UNSPECIFIED: ICD-10-CM

## 2022-09-26 DIAGNOSIS — G47.09 OTHER INSOMNIA: ICD-10-CM

## 2022-09-26 PROCEDURE — 99214 OFFICE O/P EST MOD 30 MIN: CPT | Performed by: INTERNAL MEDICINE

## 2022-09-26 RX ORDER — ESZOPICLONE 2 MG/1
2 TABLET, FILM COATED ORAL
Qty: 1 TABLET | Refills: 0 | Status: SHIPPED | OUTPATIENT
Start: 2022-09-26

## 2022-09-26 NOTE — PATIENT INSTRUCTIONS
Sleep Apnea   AMBULATORY CARE:   Sleep apnea  is a condition that causes you to stop breathing often during sleep  Types of sleep apnea:   Obstructive sleep apnea (IVY)  is the most common kind  The muscles and tissues around your throat relax and block air from passing through  Obesity, use of alcohol or cigarettes, or a family history are common causes  IVY may increase your risk for complications after surgery  Central sleep apnea (CSA)  means your brain does not send signals to the muscles that control breathing  You do not take a breath even though your airway is open  Common causes include medical conditions such as heart failure, being older than 40, or use of opioids  Complex (or mixed) sleep apnea  means you have both obstructive and central sleep apnea  Common signs and symptoms:   Loud snoring or long pauses in breathing    Feeling sleepy, slow, and tired during the day    Snorting, gasping, or choking while you sleep, and waking up suddenly because of these    Feeling irritable during the day    Dry mouth or a headache in the mornings    Heavy night sweating    A hard time thinking, remembering things, or focusing on your tasks the following day    Call your local emergency number (911 in the [address] [facility]) if:   You have chest pain or trouble breathing  Call your doctor if:   You have new or worsening signs or symptoms  You have questions or concerns about your condition or care  Treatment  depends on the kind of apnea you have  A mouth device  may be needed if you have mild sleep apnea  These are designed to keep your throat open  Ask your dentist or healthcare provider about the best mouth device for you  A machine  may be used to help you get more air during sleep  A mask may be placed over your nose and mouth, or just your nose  The mask is hooked to the machine  You will get air through the mask      A continuous positive airway pressure (CPAP) machine  is used to keep your airway open during sleep  The machine blows a gentle stream of air into the mask when you breathe  This helps keep your airway open so you can breathe more regularly  Extra oxygen may be given through the machine  A bilevel positive airway pressure (BiPAP) machine  gives air but lowers the pressure when you breathe out  An adaptive servo-ventilator (ASV)  is a machine that learns your usual breathing pattern  Then, it uses pressure to give you air and prevent stops in your breathing  Surgery  to expand your airway or remove extra tissues may be needed  Surgery is usually only considered if other treatments do not work  Manage or prevent sleep apnea:   Reach and maintain a healthy weight  Ask your healthcare provider what a healthy weight is for you  Ask him or her to help you create a safe weight loss plan if you are overweight  Even a small goal of a 10% weight loss can improve your symptoms  Do not smoke  Nicotine and other chemicals in cigarettes and cigars can cause lung damage  Ask your healthcare provider for information if you currently smoke and need help to quit  E-cigarettes or smokeless tobacco still contain nicotine  Talk to your healthcare provider before you use these products  Do not drink alcohol or take sedative medicine before you go to sleep  Alcohol and sedatives can relax the muscles and tissues around your throat  This can block the airflow to your lungs  Sleep on your side or use pillows designed to prevent sleep apnea  This prevents your tongue or other tissues from blocking your throat  You can also raise the head of your bed  Follow up with your doctor or specialist as directed: You may need to have blood tests during your follow-up visits  Work with your provider to find the right breathing support equipment and settings for you  Write down your questions so you remember to ask them during your visits    © Copyright [employer] 2022 Information is for End User's use only and may not be sold, redistributed or otherwise used for commercial purposes  All illustrations and images included in CareNotes® are the copyrighted property of A D A M [employer] Inc  or [employer]  The above information is an  only  It is not intended as medical advice for individual conditions or treatments  Talk to your doctor, nurse or pharmacist before following any medical regimen to see if it is safe and effective for you

## 2022-09-26 NOTE — PROGRESS NOTES
Pulmonary/Sleep Follow Up Note   Donovan Pour 60 y o  male MRN: [id number]  9/26/2022      Assessment and Plan:    1  Obstructive sleep apnea (adult) (pediatric)  Assessment & Plan: Moderate IVY with AHI of 17 5 events per hour, has been using CPAP and underwent an inspire implantation evaluation and he wanted to discontinue using the CPAP  He underwent the activation in June with Dr Jayson Bello  He is here for the pre fine tuning study evaluation  We downloaded his compliance from the inspire remote control today with excellent compliance averaging 8 and half hours per night  He reportedly feels good sleep quality and less tired during the day as close to the CPAP and he does not think that he snores anymore  We reviewed his settings today he is at level 8 which is equivalent to 1 4 volts and his patient control range is 0 7-1 7 volts  He will need to be seen for a follow-up after the fine tuning study within 6 weeks    Orders:  -     Ambulatory Referral to Sleep Medicine  -     eszopiclone (LUNESTA) 2 mg tablet; Take 1 tablet (2 mg total) by mouth daily at bedtime as needed for sleep Take immediately before bedtime on the sleep study night    2  Sleep apnea, unspecified  -     Ambulatory Referral to Sleep Medicine  -     eszopiclone (LUNESTA) 2 mg tablet; Take 1 tablet (2 mg total) by mouth daily at bedtime as needed for sleep Take immediately before bedtime on the sleep study night    3  Other insomnia  Assessment & Plan:  Patient experiences 1st night effect when he gets in-lab studies, I prescribed 1 tablet clonus the 2 mg for the night of the sleep study    Orders:  -     Ambulatory Referral to Sleep Medicine  -     eszopiclone (LUNESTA) 2 mg tablet; Take 1 tablet (2 mg total) by mouth daily at bedtime as needed for sleep Take immediately before bedtime on the sleep study night    4  S/P insertion of hypoglossal nerve stimulator    5  Obesity (BMI 30-39  9)  Assessment & Plan:   With BMI 30 11  Noted           Return in about 4 weeks (around 10/24/2022)  History of Present Illness   HPI:  David Aguilera is a 60 y o  male who is here for follow-up appointment  He has history of moderate obstructive sleep apnea diagnosed in the past, and he underwent an inspire/hypoglossal nerve stimulation evaluation seem to be appropriate candidate he underwent implantation June of this year had activated with Dr Trina Umanzor  He reports significant improvement of his snoring, sleep quality and excessive daytime sleepiness he is averaging 8-9 hours per night with Selkirk Score residual of 3/24 today  Review of Systems      Genitourinary none   Cardiology none   Gastrointestinal none   Neurology none   Constitutional none   Integumentary none   Psychiatry anxiety   Musculoskeletal none   Pulmonary none   ENT none   Endocrine none   Hematological none           Historical Information   Past Medical History:   Diagnosis Date    Allergies     Anxiety     Arthritis     Asthma     BPH (benign prostatic hyperplasia)     CPAP (continuous positive airway pressure) dependence     Depression     Diabetes mellitus (HCC)     Disease of thyroid gland     GERD (gastroesophageal reflux disease)     Hyperchloremia     Hyperlipidemia     Hypertension     Myocardial infarction (HCC)     PONV (postoperative nausea and vomiting)     Sarcoidosis     Sleep apnea      Past Surgical History:   Procedure Laterality Date    CARDIAC SURGERY      3 stents    CARPAL TUNNEL RELEASE Bilateral     COLONOSCOPY      EXAMINATION UNDER ANESTHESIA N/A 1/12/2022    Procedure: DRUG INDUCED SLEEP ENDOSCOPY (DISE);   Surgeon: Eboni Cabrera MD;  Location: [facility];  Service: ENT    HERNIA REPAIR      KNEE SURGERY Right     arthroscopy    LUNG BIOPSY      sarcoidosis    [place] INCISION IMPLANT CRANIAL NERVE STIM ELECTRODE/PULSE GEN N/A 5/11/2022    Procedure: INSERTION UPPER AIRWAY STIMULATOR;  Surgeon: Eboni Cabrera MD;  Location[facility];  Service: ENT Family History   Problem Relation Age of Onset    Emphysema Father     Heart attack Mother     Lung cancer Brother     Diabetes Sister     Anxiety disorder Sister     Sleep apnea Sister     No Known Problems Son          Meds/Allergies     Current Outpatient Medications:     ALPRAZolam (XANAX) 0 5 mg tablet, TAKE 1/2 1 TABLETS (0 25 0 5 MG TOTAL) BY MOUTH DAILY AS NEEDED   FOR ANXIETY, Disp: , Rfl:     [employer] ASPIRIN ADULT LOW DOSE 81 MG chewable tablet, Chew 81 mg daily, Disp: , Rfl:     DULoxetine (CYMBALTA) 30 mg delayed release capsule, Take 30 mg by mouth daily  , Disp: , Rfl:     dutasteride (AVODART) 0 5 mg capsule, Take 1 capsule (0 5 mg total) by mouth 3 (three) times a week, Disp: 60 capsule, Rfl: 3    esomeprazole (NexIUM) 40 MG capsule, Take 40 mg by mouth every morning before breakfast, Disp: , Rfl:     eszopiclone (LUNESTA) 2 mg tablet, Take 1 tablet (2 mg total) by mouth daily at bedtime as needed for sleep Take immediately before bedtime on the sleep study night, Disp: 1 tablet, Rfl: 0    levothyroxine 25 mcg tablet, Take 25 mcg by mouth daily  , Disp: , Rfl:     lisinopril (ZESTRIL) 20 mg tablet, Take 20 mg by mouth daily  , Disp: , Rfl:     metFORMIN (GLUCOPHAGE) 1000 MG tablet, Take 1,000 mg by mouth 2 (two) times a day with meals, Disp: , Rfl:     metoprolol tartrate (LOPRESSOR) 50 mg tablet, Take 25 mg by mouth every 12 (twelve) hours  , Disp: , Rfl:     Omega-3 1000 MG CAPS, Take 1,200 mg by mouth daily, Disp: , Rfl:     prasugrel (EFFIENT) tablet, Take 10 mg by mouth daily  , Disp: , Rfl:     rosuvastatin (CRESTOR) 20 MG tablet, Take 20 mg by mouth daily  , Disp: , Rfl:     Silodosin 8 MG CAPS, TAKE 1 CAPSULE BY MOUTH EVERY DAY, Disp: 90 capsule, Rfl: 3    tadalafil (CIALIS) 20 MG tablet, Take 1 tablet (20 mg total) by mouth daily as needed for erectile dysfunction (no more than 2 doses weekly), Disp: 30 tablet, Rfl: 3    ascorbic acid (VITAMIN C) 500 MG tablet, Take 500 mg by mouth daily (Patient not taking: No sig reported), Disp: , Rfl:   Allergies   Allergen Reactions    Omeprazole      Not effective     Sulfa Antibiotics Nausea Only    Amoxicillin Nausea Only    Atorvastatin Myalgia    Morphine Nausea Only       Vitals: Blood pressure 132/60, pulse 75, height 5' 8" (1 727 m), weight 89 8 kg (198 lb)  Body mass index is 30 11 kg/m²  Physical Exam  General:  Awake alert and oriented x 3, conversant without conversational dyspnea, NAD, normal affect  HEENT:   Sclera noninjected, nonicteric OU, Nares patent,  no craniofacial abnormalities, Mucous membranes, moist, no oral lesions, normal dentition  NECK:  Trachea midline, no accessory muscle use, no stridor,  JVP not elevated  CARDIAC: Reg, single s1/S2, no m/r/g  PULM: CTA bilaterally no wheezing, rhonchi or rales  ABD: Soft nontender, nondistended, no rebound, no rigidity, no guarding  EXT: No cyanosis, no clubbing, no edema, normal capillary refill  NEURO: no focal neurologic deficits, AAOx3, moving all extremities appropriately    Labs: I have personally reviewed pertinent lab results  , ABG: No results found for: PHART, OWJ6TNM, PO2ART, JXU2RCJ, S6GWPHFR, BEART, SOURCE, BNP: No results found for: BNP, CBC: No results found for: WBC, HGB, HCT, MCV, PLT, ADJUSTEDWBC, MCH, MCHC, RDW, MPV, NRBC, CMP: No results found for: SODIUM, K, CL, CO2, ANIONGAP, BUN, CREATININE, GLUCOSE, CALCIUM, AST, ALT, ALKPHOS, PROT, BILITOT, EGFR, PT/INR: No results found for: PT, INR, Troponin: No results found for: TROPONINI  Lab Results   Component Value Date    WBC 5 9 04/15/2022    HGB 14 2 04/15/2022    HCT 42 8 04/15/2022    MCV 90 04/15/2022     04/15/2022     Lab Results   Component Value Date    K 4 8 04/15/2022    CO2 20 04/15/2022     04/15/2022    BUN 19 04/15/2022    CREATININE 1 09 04/15/2022     No results found for: IGE  No results found for: ALT, AST, GGT, ALKPHOS, BILITOT        Sleep studies: I have personally reviewed pertinent reports  HST 2021: AHI 17 5 events/hr --> moderate IVY             Cira Bowens MD  [address] [facility]       Portions of the record may have been created with voice recognition software  Occasional wrong word or "sound a like" substitutions may have occurred due to the inherent limitations of voice recognition software  Read the chart carefully and recognize, using context, where substitutions have occurred

## 2022-09-26 NOTE — ASSESSMENT & PLAN NOTE
Moderate IVY with AHI of 17 5 events per hour, has been using CPAP and underwent an inspire implantation evaluation and he wanted to discontinue using the CPAP  He underwent the activation in June with Dr Usha Cheema  He is here for the pre fine tuning study evaluation  We downloaded his compliance from the inspire remote control today with excellent compliance averaging 8 and half hours per night  He reportedly feels good sleep quality and less tired during the day as close to the CPAP and he does not think that he snores anymore  We reviewed his settings today he is at level 8 which is equivalent to 1 4 volts and his patient control range is 0 7-1 7 volts  He will need to be seen for a follow-up after the fine tuning study within 6 weeks

## 2022-09-26 NOTE — ASSESSMENT & PLAN NOTE
Patient experiences 1st night effect when he gets in-lab studies, I prescribed 1 tablet clonus the 2 mg for the night of the sleep study

## 2022-09-29 ENCOUNTER — HOSPITAL ENCOUNTER (OUTPATIENT)
Dept: SLEEP CENTER | Facility: CLINIC | Age: 62
Discharge: HOME | End: 2022-09-29
Payer: COMMERCIAL

## 2022-09-29 DIAGNOSIS — G47.33 OBSTRUCTIVE SLEEP APNEA (ADULT) (PEDIATRIC): ICD-10-CM

## 2022-09-29 PROCEDURE — 95810 POLYSOM 6/> YRS 4/> PARAM: CPT

## 2022-09-29 PROCEDURE — 95810 POLYSOM 6/> YRS 4/> PARAM: CPT | Performed by: INTERNAL MEDICINE

## 2022-09-29 PROCEDURE — 95977 ALYS CPLX CN NPGT PRGRMG: CPT

## 2022-09-30 NOTE — PROGRESS NOTES
Sleep Study Documentation    Pre-Sleep Study       Sleep testing procedure explained to patient:YES    Patient napped prior to study:NO    Caffeine:Dayshift worker after 12PM   Caffeine use:YES- coffee  6 ounces    Alcohol:Dayshift workers after 5PM: Alcohol use:NO    Typical day for patient:YES       Study Documentation    Sleep Study Indications: Snoring    Sleep Study: Treatment   Optimal : 1 7V  Snore:Eliminated  REM Obtained: yes  Supplemental O2: no    Minimum SaO2 88  Baseline SaO2 96      Amplitude at which snoring was eliminated 1 7  Minimum SaO2 at final PAP pressure 89  Mode of Therapy: INSPIRE      EKG abnormalities: yes:  EPOCH example and comments: PAC's & PVCs ( 354, 355 )  EEG abnormalities: no    Sleep Study Recorded < 2 hours: N/A    Sleep Study Recorded > 2 hours but incomplete study: N/A    Sleep Study Recorded 6 hours but no sleep obtained: NO    Patient classification: unemployed       Post-Sleep Study    Medication used at bedtime or during sleep study:YES prescription sleep aid    Patient reports time it took to fall asleep:20 to 30 minutes    Patient reports waking up during study:1 to 2 times  Patient reports returning to sleep without difficulty  Patient reports sleeping 4 to 6 hours with dreaming  Patient reports sleep during study:typical    Patient rated sleepiness: Not sleepy or tired    PAP treatment:no

## 2022-10-05 ENCOUNTER — TELEPHONE (OUTPATIENT)
Dept: SLEEP CENTER | Facility: CLINIC | Age: 62
End: 2022-10-05

## 2022-10-05 NOTE — TELEPHONE ENCOUNTER
----- Message from Rico Agrawal MD sent at 10/5/2022  1:29 PM EDT -----  I believe he is at level 8    if so he should increase weekly to level 10 if tolerated and followup with Dr Heena Bowling (or me) for further adjustment  If he wants ,followup can be moved up to allow for adjustment based on test results

## 2022-11-15 LAB
APPEARANCE UR: CLEAR
BACTERIA URNS QL MICRO: NORMAL
BILIRUB UR QL STRIP: NEGATIVE
CASTS URNS QL MICRO: NORMAL /LPF
COLOR UR: YELLOW
EPI CELLS #/AREA URNS HPF: NORMAL /HPF (ref 0–10)
GLUCOSE UR QL: NEGATIVE
HGB UR QL STRIP: NEGATIVE
KETONES UR QL STRIP: NEGATIVE
LEUKOCYTE ESTERASE UR QL STRIP: NEGATIVE
MICRO URNS: ABNORMAL
NITRITE UR QL STRIP: NEGATIVE
PH UR STRIP: 5.5 [PH] (ref 5–7.5)
PROT UR QL STRIP: ABNORMAL
PSA SERPL-MCNC: 1 NG/ML (ref 0–4)
RBC #/AREA URNS HPF: NORMAL /HPF (ref 0–2)
SP GR UR: 1.01 (ref 1–1.03)
UROBILINOGEN UR STRIP-ACNC: 0.2 MG/DL (ref 0.2–1)
WBC #/AREA URNS HPF: NORMAL /HPF (ref 0–5)

## 2022-12-28 ENCOUNTER — OFFICE VISIT (OUTPATIENT)
Dept: SLEEP CENTER | Facility: CLINIC | Age: 62
End: 2022-12-28

## 2022-12-28 VITALS
HEART RATE: 63 BPM | WEIGHT: 199 LBS | HEIGHT: 68 IN | DIASTOLIC BLOOD PRESSURE: 59 MMHG | BODY MASS INDEX: 30.16 KG/M2 | SYSTOLIC BLOOD PRESSURE: 112 MMHG

## 2022-12-28 DIAGNOSIS — G47.00 INSOMNIA: ICD-10-CM

## 2022-12-28 DIAGNOSIS — E66.9 OBESITY (BMI 30-39.9): Primary | ICD-10-CM

## 2022-12-28 DIAGNOSIS — G47.33 OBSTRUCTIVE SLEEP APNEA (ADULT) (PEDIATRIC): ICD-10-CM

## 2022-12-28 DIAGNOSIS — I10 HYPERTENSION, UNSPECIFIED TYPE: ICD-10-CM

## 2022-12-28 DIAGNOSIS — G47.30 SLEEP APNEA: ICD-10-CM

## 2022-12-28 NOTE — PROGRESS NOTES
Pulmonary/Sleep Follow Up Note   Abbie Fuentes 60 y o  male MRN: [id number]  12/28/2022      Assessment and Plan:    1  Obesity (BMI 30-39  9)  Assessment & Plan:  BMI of 30 26, noted      2  Obstructive sleep apnea (adult) (pediatric)  -     Ambulatory Referral to Sleep Medicine    3  Sleep apnea  -     Ambulatory Referral to Sleep Medicine    4  Insomnia  -     Ambulatory Referral to Sleep Medicine    5  Hypertension, unspecified type  Assessment & Plan:  Treatment of sleep disordered breathing might help control his blood pressure        Return in about 6 months (around 6/28/2023)  History of Present Illness   HPI:  Abbie Fuentes is a 60 y o  male who is here for follow-up appointment  He has history of moderate obstructive sleep apnea diagnosed in the past, and he underwent an inspire/hypoglossal nerve stimulation evaluation seem to be appropriate candidate he underwent implantation June of this year had activated with Dr Suleman Pan    He reports significant improvement of his snoring, sleep quality and excessive daytime sleepiness he is averaging 9-10 hours per night  He underwent fine-tuning study that showed significant improvement of his underlying sleep disordered breathing events as detailed above      Review of Systems      Genitourinary none   Cardiology none   Gastrointestinal none   Neurology none   Constitutional none   Integumentary none   Psychiatry anxiety   Musculoskeletal joint pain   Pulmonary frequent cough   ENT none   Endocrine none   Hematological none             Historical Information   Past Medical History:   Diagnosis Date   • Allergies    • Anxiety    • Arthritis    • Asthma    • BPH (benign prostatic hyperplasia)    • CPAP (continuous positive airway pressure) dependence    • Depression    • Diabetes mellitus (HCC)    • Disease of thyroid gland    • GERD (gastroesophageal reflux disease)    • Hyperchloremia    • Hyperlipidemia    • Hypertension    • My[facility] infarction ([facility] )    • PONV (postoperative nausea and vomiting)    • Sarcoidosis    • Sleep apnea      Past Surgical History:   Procedure Laterality Date   • CARDIAC SURGERY      3 stents   • CARPAL TUNNEL RELEASE Bilateral    • COLONOSCOPY     • EXAMINATION UNDER ANESTHESIA N/A 1/12/2022    Procedure: DRUG INDUCED SLEEP ENDOSCOPY (DISE); Surgeon: Eloisa Barron MD;  [facility];  Service: ENT   • HERNIA REPAIR     • KNEE SURGERY Right     arthroscopy   • LUNG BIOPSY      sarcoidosis   • [place] OPEN IMPLANTATION CRANIAL NERVE MIKE & PULSE GEN N/A 5/11/2022    Procedure: INSERTION UPPER AIRWAY STIMULATOR;  Surgeon: Eloisa Barron MD;  [facility];  Service: ENT     Family History   Problem Relation Age of Onset   • Emphysema Father    • Heart attack Mother    • Lung cancer Brother    • Diabetes Sister    • Anxiety disorder Sister    • Sleep apnea Sister    • No Known Problems Son          Meds/Allergies     Current Outpatient Medications:   •  ALPRAZolam (XANAX) 0 5 mg tablet, TAKE 1/2 1 TABLETS (0 25 0 5 MG TOTAL) BY MOUTH DAILY AS NEEDED   FOR ANXIETY, Disp: , Rfl:   •  [employer]S ASPIRIN ADULT LOW DOSE 81 MG chewable tablet, Chew 81 mg daily, Disp: , Rfl:   •  DULoxetine (CYMBALTA) 30 mg delayed release capsule, Take 30 mg by mouth daily  , Disp: , Rfl:   •  esomeprazole (NexIUM) 40 MG capsule, Take 40 mg by mouth every morning before breakfast, Disp: , Rfl:   •  levothyroxine 25 mcg tablet, Take 25 mcg by mouth daily  , Disp: , Rfl:   •  lisinopril (ZESTRIL) 20 mg tablet, Take 20 mg by mouth daily  , Disp: , Rfl:   •  metFORMIN (GLUCOPHAGE) 1000 MG tablet, Take 1,000 mg by mouth 2 (two) times a day with meals, Disp: , Rfl:   •  metoprolol tartrate (LOPRESSOR) 50 mg tablet, Take 25 mg by mouth every 12 (twelve) hours  , Disp: , Rfl:   •  Omega-3 1000 MG CAPS, Take 1,200 mg by mouth daily, Disp: , Rfl:   •  prasugrel (EFFIENT) tablet, Take 10 mg by mouth daily  , Disp: , Rfl:   •  rosuvastatin (CRESTOR) 20 MG tablet, Take 20 mg by mouth daily  , Disp: , Rfl:   •  Silodosin 8 MG CAPS, TAKE 1 CAPSULE BY MOUTH EVERY DAY, Disp: 90 capsule, Rfl: 3  •  ascorbic acid (VITAMIN C) 500 MG tablet, Take 500 mg by mouth daily (Patient not taking: Reported on 12/28/2022), Disp: , Rfl:   •  dutasteride (AVODART) 0 5 mg capsule, Take 1 capsule (0 5 mg total) by mouth 3 (three) times a week, Disp: 60 capsule, Rfl: 3  •  eszopiclone (LUNESTA) 2 mg tablet, Take 1 tablet (2 mg total) by mouth daily at bedtime as needed for sleep Take immediately before bedtime on the sleep study night, Disp: 1 tablet, Rfl: 0  •  tadalafil (CIALIS) 20 MG tablet, Take 1 tablet (20 mg total) by mouth daily as needed for erectile dysfunction (no more than 2 doses weekly), Disp: 30 tablet, Rfl: 3  Allergies   Allergen Reactions   • Omeprazole      Not effective    • Sulfa Antibiotics Nausea Only   • Amoxicillin Nausea Only   • Atorvastatin Myalgia   • Morphine Nausea Only       Vitals: Blood pressure 112/59, pulse 63, height 5' 8" (1 727 m), weight 90 3 kg (199 lb)  Body mass index is 30 26 kg/m²  Physical Exam  General:  Awake alert and oriented x 3, conversant without conversational dyspnea, NAD, normal affect  HEENT:   Sclera noninjected, nonicteric OU, Nares patent,  no craniofacial abnormalities, Mucous membranes, moist, no oral lesions, normal dentition  NECK:  Trachea midline, no accessory muscle use, no stridor,  JVP not elevated  CARDIAC: Reg, single s1/S2, no m/r/g  PULM: CTA bilaterally no wheezing, rhonchi or rales  ABD: Soft nontender, nondistended, no rebound, no rigidity, no guarding  EXT: No cyanosis, no clubbing, no edema, normal capillary refill  NEURO: no focal neurologic deficits, AAOx3, moving all extremities appropriately    Labs: I have personally reviewed pertinent lab results  , ABG: No results found for: PHART, AOE2ROC, PO2ART, PDT3WXJ, D8OAUVYZ, BEART, SOURCE, BNP: No results found for: BNP, CBC: No results found for: WBC, HGB, HCT, MCV, PLT, ADJUSTEDWBC, MCH, MCHC, RDW, MPV, NRBC, CMP: No results found for: SODIUM, K, CL, CO2, ANIONGAP, BUN, CREATININE, GLUCOSE, CALCIUM, AST, ALT, ALKPHOS, PROT, BILITOT, EGFR, PT/INR: No results found for: PT, INR, Troponin: No results found for: TROPONINI  Lab Results   Component Value Date    WBC 5 9 04/15/2022    HGB 14 2 04/15/2022    HCT 42 8 04/15/2022    MCV 90 04/15/2022     04/15/2022     Lab Results   Component Value Date    K 4 8 04/15/2022    CO2 20 04/15/2022     04/15/2022    BUN 19 04/15/2022    CREATININE 1 09 04/15/2022     No results found for: IGE  No results found for: ALT, AST, GGT, ALKPHOS, BILITOT        Sleep studies: I have personally reviewed pertinent reports  HST 2021: AHI 17 5 events/hr --> moderate IVY             Cristofer Badillo MD  [facility]       Portions of the record may have been created with voice recognition software  Occasional wrong word or "sound a like" substitutions may have occurred due to the inherent limitations of voice recognition software  Read the chart carefully and recognize, using context, where substitutions have occurred

## 2022-12-28 NOTE — PROGRESS NOTES
Review of Systems      Genitourinary none   Cardiology none   Gastrointestinal none   Neurology none   Constitutional none   Integumentary none   Psychiatry anxiety   Musculoskeletal joint pain   Pulmonary frequent cough   ENT none   Endocrine none   Hematological none

## 2022-12-28 NOTE — PATIENT INSTRUCTIONS
Sleep Apnea   AMBULATORY CARE:   Sleep apnea  is a condition that causes you to stop breathing often during sleep  Types of sleep apnea:   Obstructive sleep apnea (IVY)  is the most common kind  The muscles and tissues around your throat relax and block air from passing through  Obesity, use of alcohol or cigarettes, or a family history are common causes  IVY may increase your risk for complications after surgery  Central sleep apnea (CSA)  means your brain does not send signals to the muscles that control breathing  You do not take a breath even though your airway is open  Common causes include medical conditions such as heart failure, being older than 40, or use of opioids  Complex (or mixed) sleep apnea  means you have both obstructive and central sleep apnea  Common signs and symptoms:   Loud snoring or long pauses in breathing    Feeling sleepy, slow, and tired during the day    Snorting, gasping, or choking while you sleep, and waking up suddenly because of these    Feeling irritable during the day    Dry mouth or a headache in the mornings    Heavy night sweating    A hard time thinking, remembering things, or focusing on your tasks the following day    Call your local emergency number (911 in the [address] [facility]) if:   You have chest pain or trouble breathing  Call your doctor if:   You have new or worsening signs or symptoms  You have questions or concerns about your condition or care  Treatment  depends on the kind of apnea you have  A mouth device  may be needed if you have mild sleep apnea  These are designed to keep your throat open  Ask your dentist or healthcare provider about the best mouth device for you  A machine  may be used to help you get more air during sleep  A mask may be placed over your nose and mouth, or just your nose  The mask is hooked to the machine  You will get air through the mask      A continuous positive airway pressure (CPAP) machine  is used to keep your airway open during sleep  The machine blows a gentle stream of air into the mask when you breathe  This helps keep your airway open so you can breathe more regularly  Extra oxygen may be given through the machine  A bilevel positive airway pressure (BiPAP) machine  gives air but lowers the pressure when you breathe out  An adaptive servo-ventilator (ASV)  is a machine that learns your usual breathing pattern  Then, it uses pressure to give you air and prevent stops in your breathing  Surgery  to expand your airway or remove extra tissues may be needed  Surgery is usually only considered if other treatments do not work  Manage or prevent sleep apnea:   Reach and maintain a healthy weight  Ask your healthcare provider what a healthy weight is for you  Ask him or her to help you create a safe weight loss plan if you are overweight  Even a small goal of a 10% weight loss can improve your symptoms  Do not smoke  Nicotine and other chemicals in cigarettes and cigars can cause lung damage  Ask your healthcare provider for information if you currently smoke and need help to quit  E-cigarettes or smokeless tobacco still contain nicotine  Talk to your healthcare provider before you use these products  Do not drink alcohol or take sedative medicine before you go to sleep  Alcohol and sedatives can relax the muscles and tissues around your throat  This can block the airflow to your lungs  Sleep on your side or use pillows designed to prevent sleep apnea  This prevents your tongue or other tissues from blocking your throat  You can also raise the head of your bed  Follow up with your doctor or specialist as directed: You may need to have blood tests during your follow-up visits  Work with your provider to find the right breathing support equipment and settings for you  Write down your questions so you remember to ask them during your visits    © Copyright [employer] 2022 Information is for End User's use only and may not be sold, redistributed or otherwise used for commercial purposes  All illustrations and images included in CareNotes® are the copyrighted property of A D A M [employer] Inc  or [employer]  The above information is an  only  It is not intended as medical advice for individual conditions or treatments  Talk to your doctor, nurse or pharmacist before following any medical regimen to see if it is safe and effective for you

## 2022-12-28 NOTE — ASSESSMENT & PLAN NOTE
Moderate IVY diagnosed in October 2021 with AHI of 24 1 events per hour has been doing well on the inspire he has failed CPAP therapy in the past  He is fine-tuning study was done and showed significant improvement of his sleep apnea events down to 0 9 events on 1 7 V  I reviewed his compliance today and he is averaging 9 to 10 hours of sleep per night his only concern is that he feels that the inspire kicks in faster than half an hour which is his start delay set up  I reviewed his stimulation, continue to leave home patient ambulated 1 7, not with his titration level to 5 levels only from 1 6-2 1, increase his start to delay to 1 hour, increase his sleep duration to 10 hours, reviewed with phone with no red flags  He will need to follow-up in 6 months

## 2023-01-31 ENCOUNTER — TELEPHONE (OUTPATIENT)
Dept: UROLOGY | Facility: MEDICAL CENTER | Age: 63
End: 2023-01-31

## 2023-01-31 NOTE — TELEPHONE ENCOUNTER
KIMBERLEYM to call and schedule office visit to discuss options; Noted Dr Magdalena Lake opening is April, but may schedule with another physician        ----- Message from Karin Reyna RN sent at 1/31/2023 10:50 AM EST -----  Regarding: FW: BPH treatment options  Contact: [phone]  Please schedule patient to come in to office to discuss  ----- Message -----  From: NELLIE Patton  Sent: 1/31/2023   9:28 AM EST  To: Karin Reyna RN, #  Subject: FW: BPH treatment options                        Please have pt follow up with one of the physicians to discuss option      ----- Message -----  From: Karin Reyna RN  Sent: 1/31/2023   8:08 AM EST  To: [facility]  Subject: FW: BPH treatment options                          ----- Message -----  From: Christal Castañeda "Al"  Sent: 1/30/2023   7:02 PM EST  To: [facility]  Subject: BPH treatment options                            Hi Dr Brie Gutierrez, I have a few more treatment questions as u and I had talk about green light lazer surgery  I’m also reading about PAE and also Rezum  I was wondering if you think I could try one of these as it seems another option  I’m not looking for miracles just some help PAE seems pretty non invasive and may get me back to work etc a lot faster   Would like to know what u think as I have a week off in March that I may try to schedule something for  Thank you so much  Ria Pollack

## 2023-01-31 NOTE — TELEPHONE ENCOUNTER
Patient called today regarding he has questions for other treatment procedures  Please call patient back

## 2023-01-31 NOTE — TELEPHONE ENCOUNTER
LVM for patient to call and schedule appointment to discuss his options  He may schedule with Dr Dayan Akers in April or another provider sooner

## 2023-03-24 ENCOUNTER — OFFICE VISIT (OUTPATIENT)
Dept: UROLOGY | Facility: MEDICAL CENTER | Age: 63
End: 2023-03-24

## 2023-03-24 VITALS
DIASTOLIC BLOOD PRESSURE: 84 MMHG | HEIGHT: 68 IN | SYSTOLIC BLOOD PRESSURE: 128 MMHG | BODY MASS INDEX: 29.86 KG/M2 | WEIGHT: 197 LBS

## 2023-03-24 DIAGNOSIS — N40.1 BPH WITH OBSTRUCTION/LOWER URINARY TRACT SYMPTOMS: Primary | ICD-10-CM

## 2023-03-24 DIAGNOSIS — N13.8 BPH WITH OBSTRUCTION/LOWER URINARY TRACT SYMPTOMS: Primary | ICD-10-CM

## 2023-03-24 NOTE — PROGRESS NOTES
Assessment/Plan:    BPH with obstruction/lower urinary tract symptoms  AUA symptom score is 14 on Rapaflo  We had a long discussion today regarding treatment options including transurethral resection of the prostate, photoselective vaporization the prostate, prostatic artery embolization, and rezum  Pros and cons of each were discussed  He does have a large median lobe on cystoscopy and is thus not a good candidate for UroLift  The patient is most interested in Rezum  It is not currently offered in the [facility] system  I did offer to for him to other facility that provides Rezum  At this point he is content to wait and continue medical therapy  We will recheck a PSA and urinalysis in November  He will return after this for follow-up  Diagnoses and all orders for this visit:    BPH with obstruction/lower urinary tract symptoms  -     PSA Total, Diagnostic; Future  -     Urinalysis with microscopic; Future  -     PSA Total, Diagnostic  -     Urinalysis with microscopic          Subjective:      Patient ID: Yuly Dos Santos is a 60 y o  male  Benign Prostatic Hypertrophy  This is a chronic problem  The current episode started more than 1 year ago  The problem has been gradually improving since onset  Irritative symptoms do not include frequency, nocturia (nocturia x 1) or urgency (rare)  Obstructive symptoms include a slower stream  Obstructive symptoms do not include dribbling, incomplete emptying, an intermittent stream, straining or a weak stream  Associated symptoms include hesitancy  Pertinent negatives include no chills, dysuria, hematuria, nausea or vomiting  AUA score is 8-19  His sexual activity is non-contributory to the current illness  Treatments tried: rapaflo  The treatment provided moderate relief  He has been using treatment for 1 to 2 years  He is taking only rapflo at this time      The following portions of the patient's history were reviewed and updated as appropriate: allergies, current medications, past family history, past medical history, past social history, past surgical history and problem list     Review of Systems   Constitutional: Negative  Negative for chills, diaphoresis, fatigue and fever  HENT: Negative  Eyes: Negative  Respiratory: Negative  Cardiovascular: Negative  Gastrointestinal: Negative  Negative for nausea and vomiting  Endocrine: Negative  Genitourinary: Positive for hesitancy  Negative for dysuria, frequency, hematuria, incomplete emptying, nocturia (nocturia x 1) and urgency (rare)  See HPI   Musculoskeletal: Negative  Skin: Negative  Allergic/Immunologic: Negative  Neurological: Negative  Hematological: Negative  Psychiatric/Behavioral: Negative  AUA SYMPTOM SCORE    Flowsheet Row Most Recent Value   AUA SYMPTOM SCORE    How often have you had a sensation of not emptying your bladder completely after you finished urinating? 2 (P)     How often have you had to urinate again less than two hours after you finished urinating? 2 (P)     How often have you found you stopped and started again several times when you urinate? 1 (P)     How often have you found it difficult to postpone urination? 1 (P)     How often have you had a weak urinary stream? 4 (P)     How often have you had to push or strain to begin urination? 2 (P)     How many times did you most typically get up to urinate from the time you went to bed at night until the time you got up in the morning? 2 (P)     Quality of Life: If you were to spend the rest of your life with your urinary condition just the way it is now, how would you feel about that? 4 (P)     AUA SYMPTOM SCORE 14 (P)           Objective:      /84   Ht 5' 8" (1 727 m)   Wt 89 4 kg (197 lb)   BMI 29 95 kg/m²          Physical Exam  Vitals reviewed  Constitutional:       General: He is not in acute distress  Appearance: Normal appearance  He is well-developed and normal weight   He is not ill-appearing, toxic-appearing or diaphoretic  HENT:      Head: Normocephalic and atraumatic  Eyes:      General: No scleral icterus  Conjunctiva/sclera: Conjunctivae normal    Cardiovascular:      Rate and Rhythm: Normal rate  Pulmonary:      Effort: Pulmonary effort is normal    Abdominal:      General: Bowel sounds are normal  There is no distension  Palpations: Abdomen is soft  There is no mass  Tenderness: There is no abdominal tenderness  There is no right CVA tenderness, left CVA tenderness, guarding or rebound  Hernia: No hernia is present  Genitourinary:     Penis: No phimosis or hypospadias  Testes:         Right: Mass not present  Left: Mass not present  Musculoskeletal:         General: Normal range of motion  Cervical back: Normal range of motion and neck supple  Skin:     General: Skin is warm and dry  Neurological:      General: No focal deficit present  Mental Status: He is alert and oriented to person, place, and time  Psychiatric:         Mood and Affect: Mood normal          Behavior: Behavior normal          Thought Content:  Thought content normal          Judgment: Judgment normal

## 2023-03-24 NOTE — ASSESSMENT & PLAN NOTE
AUA symptom score is 14 on Rapaflo  We had a long discussion today regarding treatment options including transurethral resection of the prostate, photoselective vaporization the prostate, prostatic artery embolization, and rezum  Pros and cons of each were discussed  He does have a large median lobe on cystoscopy and is thus not a good candidate for UroLift  The patient is most interested in Rezum  It is not currently offered in the [place]  [facility] system  I did offer to for him to other facility that provides Rezum  At this point he is content to wait and continue medical therapy  We will recheck a PSA and urinalysis in November  He will return after this for follow-up

## 2023-07-05 ENCOUNTER — OFFICE VISIT (OUTPATIENT)
Dept: SLEEP CENTER | Facility: CLINIC | Age: 63
End: 2023-07-05
Payer: COMMERCIAL

## 2023-07-05 VITALS
WEIGHT: 199 LBS | DIASTOLIC BLOOD PRESSURE: 79 MMHG | SYSTOLIC BLOOD PRESSURE: 127 MMHG | HEIGHT: 68 IN | HEART RATE: 87 BPM | BODY MASS INDEX: 30.16 KG/M2

## 2023-07-05 DIAGNOSIS — G47.00 INSOMNIA: ICD-10-CM

## 2023-07-05 DIAGNOSIS — G47.33 OBSTRUCTIVE SLEEP APNEA (ADULT) (PEDIATRIC): ICD-10-CM

## 2023-07-05 DIAGNOSIS — G47.33 OSA (OBSTRUCTIVE SLEEP APNEA): Primary | ICD-10-CM

## 2023-07-05 DIAGNOSIS — I10 ESSENTIAL (PRIMARY) HYPERTENSION: ICD-10-CM

## 2023-07-05 DIAGNOSIS — E66.9 OBESITY (BMI 30-39.9): ICD-10-CM

## 2023-07-05 PROCEDURE — 99214 OFFICE O/P EST MOD 30 MIN: CPT | Performed by: INTERNAL MEDICINE

## 2023-07-05 NOTE — PATIENT INSTRUCTIONS
Sleep Apnea   AMBULATORY CARE:   Sleep apnea  is a condition that causes you to stop breathing often during sleep. Types of sleep apnea:   Obstructive sleep apnea (IVY)  is the most common kind. The muscles and tissues around your throat relax and block air from passing through. Obesity, use of alcohol or cigarettes, or a family history are common causes. IVY may increase your risk for complications after surgery. Central sleep apnea (CSA)  means your brain does not send signals to the muscles that control breathing. You do not take a breath even though your airway is open. Common causes include medical conditions such as heart failure, being older than 40, or use of opioids. Complex (or mixed) sleep apnea  means you have both obstructive and central sleep apnea. Common signs and symptoms:   Loud snoring or long pauses in breathing    Feeling sleepy, slow, and tired during the day    Snorting, gasping, or choking while you sleep, and waking up suddenly because of these    Feeling irritable during the day    Dry mouth or a headache in the mornings    Heavy night sweating    A hard time thinking, remembering things, or focusing on your tasks the following day    Call your local emergency number (911 in the [address]) if:   You have chest pain or trouble breathing. Call your doctor if:   You have new or worsening signs or symptoms. You have questions or concerns about your condition or care. Treatment  depends on the kind of apnea you have. A mouth device  may be needed if you have mild sleep apnea. These are designed to keep your throat open. Ask your dentist or healthcare provider about the best mouth device for you. A machine  may be used to help you get more air during sleep. A mask may be placed over your nose and mouth, or just your nose. The mask is hooked to the machine. You will get air through the mask.     A continuous positive airway pressure (CPAP) machine  is used to keep your airway open during sleep. The machine blows a gentle stream of air into the mask when you breathe. This helps keep your airway open so you can breathe more regularly. Extra oxygen may be given through the machine. A bilevel positive airway pressure (BiPAP) machine  gives air but lowers the pressure when you breathe out. An adaptive servo-ventilator (ASV)  is a machine that learns your usual breathing pattern. Then, it uses pressure to give you air and prevent stops in your breathing. Surgery  to expand your airway or remove extra tissues may be needed. Surgery is usually only considered if other treatments do not work. Manage or prevent sleep apnea:   Reach and maintain a healthy weight. Ask your healthcare provider what a healthy weight is for you. Ask your provider to help you create a safe weight loss plan if you are overweight. Even a small goal of a 10% weight loss can improve your symptoms. Do not smoke. Nicotine and other chemicals in cigarettes and cigars can cause lung damage. Ask your healthcare provider for information if you currently smoke and need help to quit. E-cigarettes or smokeless tobacco still contain nicotine. Talk to your healthcare provider before you use these products. Do not drink alcohol or take sedative medicine before you go to sleep. Alcohol and sedatives can relax the muscles and tissues around your throat. This can block the airflow to your lungs. Sleep on your side or use pillows designed to prevent sleep apnea. This prevents your tongue or other tissues from blocking your throat. You can also raise the head of your bed. Follow up with your doctor or specialist as directed: You may need to have blood tests during your follow-up visits. Work with your provider to find the right breathing support equipment and settings for you. Write down your questions so you remember to ask them during your visits.   © Copyright [employer] 2022 Information is for End User's use only and may not be sold, redistributed or otherwise used for commercial purposes. The above information is an  only. It is not intended as medical advice for individual conditions or treatments. Talk to your doctor, nurse or pharmacist before following any medical regimen to see if it is safe and effective for you.

## 2023-07-05 NOTE — PROGRESS NOTES
Pulmonary/Sleep Follow Up Note   Cayla Bee 58 y.o. male MRN: [id number]  7/5/2023      Assessment and Plan:    1. IVY (obstructive sleep apnea)  Assessment & Plan:  · Moderate IVY diagnosed in 2021 with AHI of 24 events per hour status post inspire implant and activation, with significant improvement on the fine-tuning study down to 0.9 on 1.7 V  · The patient seems to be doing very well reviewed compliance previously is currently set at 1.6-2.1 5 levels only he and he is on 1.8 V  · His only concern was that when he wakes up in the middle of the night to use the bathroom he cannot fall asleep back because the device is active somehow he did not know that he could pause the device, I educated him on that today  · I ran the waveform with no problems  · 6 months follow-up      2. Obstructive sleep apnea (adult) (pediatric)  -     Ambulatory Referral to Sleep Medicine    3. Insomnia  -     Ambulatory Referral to Sleep Medicine    4. Essential (primary) hypertension  -     Ambulatory Referral to Sleep Medicine    5. Obesity (BMI 30-39. 9)  Assessment & Plan:  BMI 30.26        Return in about 6 months (around 1/5/2024). History of Present Illness   HPI:  Cayla Bee is a 58 y.o. male who is here for follow-up appointment. He has history of moderate obstructive sleep apnea diagnosed in the past, and he underwent an inspire/hypoglossal nerve stimulation evaluation seem to be appropriate candidate he underwent implantation June of this year had activated with Dr. Curt Choi.   He reports significant improvement of his snoring, sleep quality and excessive daytime sleepiness he is averaging 9-10 hours per night  He underwent fine-tuning study that showed significant improvement of his underlying sleep disordered breathing events as detailed above          Historical Information   Past Medical History:   Diagnosis Date   • Allergies    • Anxiety    • Arthritis    • Asthma    • BPH (benign prostatic hyperplasia)    • CPAP (continuous positive airway pressure) dependence    • Depression    • Diabetes mellitus (HCC)    • Disease of thyroid gland    • GERD (gastroesophageal reflux disease)    • Hyperchloremia    • Hyperlipidemia    • Hypertension    • Myocardial infarction ([address])    • PONV (postoperative nausea and vomiting)    • Sarcoidosis    • Sleep apnea      Past Surgical History:   Procedure Laterality Date   • CARDIAC SURGERY      3 stents   • CARPAL TUNNEL RELEASE Bilateral    • COLONOSCOPY     • EXAMINATION UNDER ANESTHESIA N/A 1/12/2022    Procedure: DRUG INDUCED SLEEP ENDOSCOPY (DISE); Surgeon: Nunu Ly MD;  Location: [facility];  Service: ENT   • HERNIA REPAIR     • KNEE SURGERY Right     arthroscopy   • LUNG BIOPSY      sarcoidosis   • AR OPEN IMPLANTATION CRANIAL NERVE MIKE & PULSE GEN N/A 5/11/2022    Procedure: INSERTION UPPER AIRWAY STIMULATOR;  Surgeon: Nunu Ly MD;  Location: [facility];  Service: ENT     Family History   Problem Relation Age of Onset   • Emphysema Father    • Heart attack Mother    • Lung cancer Brother    • Diabetes Sister    • Anxiety disorder Sister    • Sleep apnea Sister    • No Known Problems Son          Meds/Allergies     Current Outpatient Medications:   •  ALPRAZolam (XANAX) 0.5 mg tablet, TAKE 1/2 1 TABLETS (0.25 0.5 MG TOTAL) BY MOUTH DAILY AS NEEDED.  FOR ANXIETY, Disp: , Rfl:   •  ascorbic acid (VITAMIN C) 500 MG tablet, Take 500 mg by mouth daily, Disp: , Rfl:   •  [employer] ASPIRIN ADULT LOW DOSE 81 MG chewable tablet, Chew 81 mg daily, Disp: , Rfl:   •  DULoxetine (CYMBALTA) 30 mg delayed release capsule, Take 30 mg by mouth daily  , Disp: , Rfl:   •  esomeprazole (NexIUM) 40 MG capsule, Take 40 mg by mouth every morning before breakfast, Disp: , Rfl:   •  levothyroxine 25 mcg tablet, Take 25 mcg by mouth daily  , Disp: , Rfl:   •  lisinopril (ZESTRIL) 20 mg tablet, Take 20 mg by mouth daily  , Disp: , Rfl:   •  metFORMIN (GLUCOPHAGE) 1000 MG tablet, Take 1,000 mg by mouth 2 (two) times a day with meals, Disp: , Rfl:   •  metoprolol tartrate (LOPRESSOR) 50 mg tablet, Take 25 mg by mouth every 12 (twelve) hours  , Disp: , Rfl:   •  Omega-3 1000 MG CAPS, Take 1,200 mg by mouth daily, Disp: , Rfl:   •  prasugrel (EFFIENT) tablet, Take 10 mg by mouth daily  , Disp: , Rfl:   •  rosuvastatin (CRESTOR) 20 MG tablet, Take 20 mg by mouth daily  , Disp: , Rfl:   •  Silodosin 8 MG CAPS, TAKE 1 CAPSULE BY MOUTH EVERY DAY, Disp: 90 capsule, Rfl: 3  •  tadalafil (CIALIS) 20 MG tablet, Take 1 tablet (20 mg total) by mouth daily as needed for erectile dysfunction (no more than 2 doses weekly), Disp: 30 tablet, Rfl: 3  Allergies   Allergen Reactions   • Omeprazole      Not effective    • Sulfa Antibiotics Nausea Only   • Amoxicillin Nausea Only   • Atorvastatin Myalgia   • Morphine Nausea Only       Vitals: Blood pressure 127/79, pulse 87, height 5' 8" (1.727 m), weight 90.3 kg (199 lb). Body mass index is 30.26 kg/m². Physical Exam  General:  Awake alert and oriented x 3, conversant without conversational dyspnea, NAD, normal affect  HEENT:   Sclera noninjected, nonicteric OU, Nares patent,  no craniofacial abnormalities, Mucous membranes, moist, no oral lesions, normal dentition  NECK:  Trachea midline, no accessory muscle use, no stridor,  JVP not elevated  CARDIAC: Reg, single s1/S2, no m/r/g  PULM: CTA bilaterally no wheezing, rhonchi or rales  ABD: Soft nontender, nondistended, no rebound, no rigidity, no guarding  EXT: No cyanosis, no clubbing, no edema, normal capillary refill  NEURO: no focal neurologic deficits, AAOx3, moving all extremities appropriately    Labs: I have personally reviewed pertinent lab results. , ABG: No results found for: "PHART", "CMK8ZFY", "PO2ART", "UDI4YVV", "V4ORZUPB", "BEART", "SOURCE", BNP: No results found for: "BNP", CBC: No results found for: "WBC", "HGB", "HCT", "MCV", "PLT", "ADJUSTEDWBC", "RBC", "MCH", "MCHC", "RDW", "MPV", "NRBC", CMP: No results found for: "SODIUM", "K", "CL", "CO2", "ANIONGAP", "BUN", "CREATININE", "GLUCOSE", "CALCIUM", "AST", "ALT", "ALKPHOS", "PROT", "BILITOT", "EGFR", PT/INR: No results found for: "PT", "INR", Troponin: No results found for: "TROPONINI"  Lab Results   Component Value Date    WBC 5.9 04/15/2022    HGB 14.2 04/15/2022    HCT 42.8 04/15/2022    MCV 90 04/15/2022     04/15/2022     Lab Results   Component Value Date    K 4.8 04/15/2022    CO2 20 04/15/2022     04/15/2022    BUN 19 04/15/2022    CREATININE 1.09 04/15/2022     No results found for: "IGE"  No results found for: "ALT", "AST", "GGT", "ALKPHOS", "BILITOT"        Sleep studies: I have personally reviewed pertinent reports. HST 2021: AHI 17.5 events/hr --> moderate IVY       PSG with inspire 2022  Patient slept 338 minutes out of 371 minutes representing sleep efficiency of 91%. Sleep architecture demonstrated normal sleep latency and increased REM latency. It also demonstrated increased stage N1 and stage N2 sleep, and decreased stage N3 and REM sleep. Oxygenation was adequate throughout the night with 0.8 minutes below 90% saturation and lowest SpO2 of 88%. EKG revealed occasional PVCs but was otherwise unremarkable. Inspire titration was initiated at 1.3 V and was increased up to 1.7 V. At 1.7 V he did well with for hypopnea episodes (AHI of 0.9 events per hour) which was tested with adequate sleep time, supine position, and REM sleep. Patient still had some snoring at 1.7 V stimulation. Therefore I will recommend to increase inspire titration up to 1.7 V and go higher up to1.8 V or even 1.9 V for optimal management of sleep apnea and better sleep. Clinical correlation suggested      Wilfredo Lea MD  [address] [facility]       Portions of the record may have been created with voice recognition software.  Occasional wrong word or "sound a like" substitutions may have occurred due to the inherent limitations of voice recognition software. Read the chart carefully and recognize, using context, where substitutions have occurred.

## 2023-07-05 NOTE — ASSESSMENT & PLAN NOTE
· Moderate IVY diagnosed in 2021 with AHI of 24 events per hour status post inspire implant and activation, with significant improvement on the fine-tuning study down to 0.9 on 1.7 V  · The patient seems to be doing very well reviewed compliance previously is currently set at 1.6-2.1 5 levels only he and he is on 1.8 V  · His only concern was that when he wakes up in the middle of the night to use the bathroom he cannot fall asleep back because the device is active somehow he did not know that he could pause the device, I educated him on that today  · I ran the waveform with no problems  · 6 months follow-up

## 2023-08-10 DIAGNOSIS — N40.0 BPH WITHOUT OBSTRUCTION/LOWER URINARY TRACT SYMPTOMS: ICD-10-CM

## 2023-08-10 RX ORDER — SILODOSIN 8 MG/1
CAPSULE ORAL
Qty: 90 CAPSULE | Refills: 3 | Status: SHIPPED | OUTPATIENT
Start: 2023-08-10

## 2023-12-17 DIAGNOSIS — N52.9 ERECTILE DYSFUNCTION, UNSPECIFIED ERECTILE DYSFUNCTION TYPE: ICD-10-CM

## 2023-12-18 RX ORDER — TADALAFIL 20 MG/1
20 TABLET ORAL DAILY PRN
Qty: 30 TABLET | Refills: 0 | Status: SHIPPED | OUTPATIENT
Start: 2023-12-18

## 2024-04-09 DIAGNOSIS — N40.0 BPH WITHOUT OBSTRUCTION/LOWER URINARY TRACT SYMPTOMS: ICD-10-CM

## 2024-04-09 RX ORDER — SILODOSIN 8 MG/1
1 CAPSULE ORAL DAILY
Qty: 90 CAPSULE | Refills: 1 | Status: SHIPPED | OUTPATIENT
Start: 2024-04-09

## 2024-07-10 ENCOUNTER — OFFICE VISIT (OUTPATIENT)
Dept: SLEEP CENTER | Facility: CLINIC | Age: 64
End: 2024-07-10
Payer: COMMERCIAL

## 2024-07-10 VITALS
BODY MASS INDEX: 29.4 KG/M2 | DIASTOLIC BLOOD PRESSURE: 56 MMHG | HEIGHT: 68 IN | HEART RATE: 62 BPM | SYSTOLIC BLOOD PRESSURE: 104 MMHG | WEIGHT: 194 LBS

## 2024-07-10 DIAGNOSIS — E66.9 OBESITY (BMI 30-39.9): ICD-10-CM

## 2024-07-10 DIAGNOSIS — G47.33 OSA (OBSTRUCTIVE SLEEP APNEA): Primary | ICD-10-CM

## 2024-07-10 DIAGNOSIS — Z96.82 S/P INSERTION OF HYPOGLOSSAL NERVE STIMULATOR: ICD-10-CM

## 2024-07-10 PROCEDURE — G2211 COMPLEX E/M VISIT ADD ON: HCPCS | Performed by: INTERNAL MEDICINE

## 2024-07-10 PROCEDURE — 99214 OFFICE O/P EST MOD 30 MIN: CPT | Performed by: INTERNAL MEDICINE

## 2024-07-10 NOTE — PATIENT INSTRUCTIONS
"Patient Education     Nerve Stimulator to Treat Sleep Apnea   Why is this procedure done?   Sleep apnea is a condition that makes you stop breathing for a short period while you are asleep. There are 2 types of sleep apnea. One is called \"obstructive sleep apnea.\" The other is called \"central sleep apnea.\"  In obstructive sleep apnea, you stop breathing because your throat narrows or closes. In central sleep apnea, you stop breathing because your brain does not send the right signals to your muscles to make you breathe.  You may have one or both types of sleep apnea. With either type, your breathing stops or it gets very shallow for at least 10 seconds while you are sleeping. Then, the amount of oxygen in your blood may drop. This results in poor sleep and makes you tired during the day. Your doctor will do an overnight sleep study to see if you have sleep apnea.  To treat your sleep apnea, the doctor may want to use a nerve stimulator. This device is like a pacemaker, but for breathing instead of your heart. The nerve stimulator monitors your breathing while you sleep. It sends a signal to a nerve if you have gone too long without breathing and this causes you to take a breath. The nerve stimulated depends on the cause of your sleep apnea.  A nerve stimulator is made up of two parts:  Pulse generator ? Houses the battery.  Lead wires - One wire senses your breathing and the other wire sends the electric stimulation from the generator to the nerve.  You may need to have a nerve stimulator to treat your sleep apnea if:  You do not tolerate positive airway pressure therapy or it does not help.  Medications do not help stimulate your breathing.  What happens before the procedure?   Your doctor will ask about your health history. Talk to the doctor about:  All the drugs you are taking. Be sure to include all prescription, over the counter, vitamins, and herbal supplements. Bring a list of drugs you take with you.  Any " bleeding problems. Be sure to tell your doctor if you are taking any drugs that may cause bleeding. Some of these are warfarin, rivaroxaban, apixaban, ticagrelor, clopidogrel, ketorolac, ibuprofen, naproxen, or aspirin. Certain vitamins and herbs, such as garlic and fish oil, may also add to the risk for bleeding. You may need to stop these drugs as well. Talk to your doctor about them.  Tell the doctor if you have any drug allergies.  Ask a family member or friend to drive you home. You will not be allowed to drive after your procedure.  What happens during the procedure?   Your doctor will give you a drug to help you relax.  The doctor will numb your skin and then make a small cut, most often on your right upper chest. In some cases you will need another cut to place the lead near the nerve.  The doctor makes a small pocket under the skin and places the pulse generator in this pocket.  Next, the doctor tunnels the leads under the skin. One lead is placed near the nerve to stimulate breathing or muscle contractions. The other lead is placed to monitor for breathing.  The leads are connected to the pulse generator.  The doctor closes the cuts and covers them with a bandage.  What happens after the procedure?   Some people go home after the procedure and others spend the night in the hospital. This is based on how you are feeling after the procedure.  About a month after the procedure, the doctor will turn on your nerve stimulator. During the first few months of therapy, you may find the device uncomfortable. You and your doctor will work to adjust the settings during this time so you are comfortable.  Your doctor may perform another sleep study to check how well the device is working.  Your doctor may ask you to write down any symptoms you are having.  What care is needed at home?   Ask your doctor what you need to do when you go home. Make sure you ask questions if you do not understand everything the doctor  says.  Talk to your doctor about how to care for your cut site. Ask your doctor about:  When you should change your bandages.  When you may take a bath or shower.  Be sure to wash your hands before and after touching your wound or bandage.  For the first few months, you may have to limit your movement. You might have to limit how far you may raise your arm or turn your head on the side of your body with the device. Talk to your doctor to learn how far you can move your arm and head.  After your incision heals, you will not need to do anything special to care for your nerve stimulator.  Talk with your doctor if you ever need to have an MRI because your device may not be allowed in the machine.  What follow-up care is needed?   Your doctor will ask you to come back to the office to check on your progress. Be sure to keep these visits.  When do I need to call the doctor?   Too much pain at the site of the procedure after you go home.  Signs of wound infection. These include fever of 100.4°F (38°C) or higher, swelling, redness, warmth around the wound; too much pain when touched; yellowish, greenish, or bloody discharge; foul smell coming from the cut site; cut site opens up.  You are very sleepy during the day and cannot make it through your daily routines.  Last Reviewed Date   2023-09-14  Consumer Information Use and Disclaimer   This generalized information is a limited summary of diagnosis, treatment, and/or medication information. It is not meant to be comprehensive and should be used as a tool to help the user understand and/or assess potential diagnostic and treatment options. It does NOT include all information about conditions, treatments, medications, side effects, or risks that may apply to a specific patient. It is not intended to be medical advice or a substitute for the medical advice, diagnosis, or treatment of a health care provider based on the health care provider's examination and assessment of a  "patient’s specific and unique circumstances. Patients must speak with a health care provider for complete information about their health, medical questions, and treatment options, including any risks or benefits regarding use of medications. This information does not endorse any treatments or medications as safe, effective, or approved for treating a specific patient. Up[employer], Inc. and its affiliates disclaim any warranty or liability relating to this information or the use thereof. The use of this information is governed by the Terms of Use, available at https://www."[employer]"er.com/en/know/clinical-effectiveness-terms   Copyright   Copyright © 2024 Up[employer]Date, Inc. and its affiliates and/or licensors. All rights reserved.  Patient Education     Sleep apnea in adults   The Basics   Written by the doctors and editors at [employer]   What is sleep apnea? -- Sleep apnea is a condition that makes you stop breathing for short periods while you are asleep. There are 2 types of sleep apnea. One is called \"obstructive sleep apnea.\" The other is called \"central sleep apnea.\"  In obstructive sleep apnea, you stop breathing because your throat narrows or closes (figure 1). In central sleep apnea, you stop breathing because your brain does not send the right signals to your muscles to make you breathe. When people talk about sleep apnea, they are usually referring to obstructive sleep apnea, which is what this article is about.  People with sleep apnea do not know that they stop breathing when they are asleep. But they do sometimes wake up startled or gasping for breath. They also often hear from loved ones that they snore.  What are the symptoms of sleep apnea? -- The main symptoms of sleep apnea are loud snoring, tiredness, and daytime sleepiness. Other symptoms can include:   Restless sleep   Waking up choking or gasping   Morning headaches, dry mouth, or sore throat   Waking up often to urinate   Waking up feeling unrested " "or groggy   Trouble thinking clearly or remembering things  Some people with sleep apnea don't have symptoms, or don't realize that they have them.  Should I see a doctor or nurse? -- Yes. If you think that you might have sleep apnea, see your doctor.  Is there a test for sleep apnea? -- Yes. First, your doctor or nurse will ask about your symptoms. If you have a bed partner, they might also ask that person if you snore or gasp in your sleep. If the doctor or nurse suspects that you have sleep apnea, they might send you for a \"sleep study.\"  Sleep studies can sometimes be done at home, but they are usually done in a sleep lab. For the study, you spend the night in the lab, and you are hooked up to different machines that monitor your heart rate, breathing, and other body functions. The results of the test tell your doctor or nurse if you have the disorder.  Is there anything I can do on my own to help my sleep apnea? -- Yes. Some things that might help:   Try to avoid sleeping on your back, if possible. This might help some people.   Lose weight, if you have excess body weight.   Get regular physical activity. This might help you lose weight. But even if it doesn't, being active is good for your health.   Avoid alcohol, especially in the evening. Alcohol can make sleep apnea worse.  How is sleep apnea treated? -- Treatment can include:   Weight loss - As mentioned above, weight loss can help if you have excess weight or obesity. But losing weight can be challenging, and it takes time to lose enough weight to help with your sleep apnea. Most people need other treatment while they work on losing weight.   CPAP - The most effective treatment for sleep apnea is a device that keeps your airway open while you sleep. Treatment with this device is called \"continuous positive airway pressure\" (\"CPAP\"). People getting CPAP wear a face mask at night that keeps them breathing (figure 2).  If your doctor or nurse recommends a " "CPAP machine, be patient about using it. The mask might seem uncomfortable to wear at first, and the machine might seem noisy, but using the machine can really help you. People with sleep apnea who use a CPAP machine feel more rested and generally feel better.  If CPAP does not work, your doctor might suggest other treatment. Options might include:   An oral device - This is a device that you wear in your mouth. It is called an \"oral appliance\" or \"mandibular advancement device.\" It helps keep your airway open while you sleep.   Hypoglossal nerve stimulation - This involves a procedure to implant a small device into your chest. The device has a wire that connects to the nerve under your tongue. While you are sleeping, it sends an electrical signal that causes the tongue to push forward. This helps open up your airway.   Surgery to widen your airway - This might involve removing your tonsils or other tissue that blocks the airway.  Is sleep apnea dangerous? -- It can be. Risks include:   Accidents - People with sleep apnea do not get good-quality sleep, so they are often tired and not alert. This puts them at risk for car accidents and other types of accidents.   Other health problems - Studies show that people with sleep apnea are more likely than others to have high blood pressure, heart attacks, and other serious heart problems. Some people also have mood changes or depression.  In people with severe sleep apnea, getting treated (for example, with CPAP) can help lower these risks.  All topics are updated as new evidence becomes available and our peer review process is complete.  This topic retrieved from [employer] on: Feb 28, 2024.  Topic 34887 Version 18.0  Release: 32.2.4 - C32.58  © 2024 UpToDate, Inc. and/or its affiliates. All rights reserved.  figure 1: Airway in a person with sleep apnea     Normally, when a person sleeps, the airway remains open, and air can pass from the nose and mouth to the lungs. In a " person with sleep apnea, parts of the throat and mouth drop into the airway and block off the flow of air. This can cause loud snoring and interrupt breathing for short periods.  Graphic 55250 Version 6.0  figure 2: Continuous positive airway pressure (CPAP) for sleep apnea     The CPAP mask gently blows air into your nose while you sleep. It puts just enough pressure on your airway to keep it from closing. The mask in this picture fits over just the nose. Other CPAP devices have masks that fit over the nose and mouth.  Graphic 96658 Version 5.0  Consumer Information Use and Disclaimer   Disclaimer: This generalized information is a limited summary of diagnosis, treatment, and/or medication information. It is not meant to be comprehensive and should be used as a tool to help the user understand and/or assess potential diagnostic and treatment options. It does NOT include all information about conditions, treatments, medications, side effects, or risks that may apply to a specific patient. It is not intended to be medical advice or a substitute for the medical advice, diagnosis, or treatment of a health care provider based on the health care provider's examination and assessment of a patient's specific and unique circumstances. Patients must speak with a health care provider for complete information about their health, medical questions, and treatment options, including any risks or benefits regarding use of medications. This information does not endorse any treatments or medications as safe, effective, or approved for treating a specific patient. Up[employer]Date, Inc. and its affiliates disclaim any warranty or liability relating to this information or the use thereof.The use of this information is governed by the Terms of Use, available at https://www.[employer].com/en/know/clinical-effectiveness-terms. 2024© UpToDate, Inc. and its affiliates and/or licensors. All rights reserved.  Copyright   © 2024 UpToDate, Inc.  and/or its affiliates. All rights reserved.

## 2024-07-10 NOTE — PROGRESS NOTES
Pulmonary/Sleep Follow Up Note   Kyler Vazquez 64 y.o. male MRN: [id number]  7/10/2024      Assessment and Plan:    1. IVY (obstructive sleep apnea)  2. Obesity (BMI 30-39.9)  3. S/P insertion of hypoglossal nerve stimulator    Using inspire regularly I reviewed tongues motion today and waveform with no issues, downloaded the remote control compliance is excellent as detailed below    1 year follow up     History of Present Illness   HPI:  Kyler Vazquez is a 64 y.o. male who is here for follow-up appointment.  He has history of moderate obstructive sleep apnea diagnosed in the past, and he underwent an inspire/hypoglossal nerve stimulation evaluation seem to be appropriate candidate he underwent implantation June of 2022.  He reports significant improvement of his snoring, sleep quality and excessive daytime sleepiness he is averaging 9-10 hours per night  He underwent fine-tuning study that showed significant improvement of his underlying sleep disordered breathing events as detailed above          Historical Information   Past Medical History:   Diagnosis Date    Allergies     Anxiety     Arthritis     Asthma     BPH (benign prostatic hyperplasia)     CPAP (continuous positive airway pressure) dependence     Depression     Diabetes mellitus (HCC)     Disease of thyroid gland     GERD (gastroesophageal reflux disease)     Hyperchloremia     Hyperlipidemia     Hypertension     Myocardial infarction (HCC)     PONV (postoperative nausea and vomiting)     Sarcoidosis     Sleep apnea      Past Surgical History:   Procedure Laterality Date    CARDIAC SURGERY      3 stents    CARPAL TUNNEL RELEASE Bilateral     COLONOSCOPY      EXAMINATION UNDER ANESTHESIA N/A 1/12/2022    Procedure: DRUG INDUCED SLEEP ENDOSCOPY (DISE);  Surgeon: Gurvinder Earl MD;  Location: [facility] OR;  Service: ENT    HERNIA REPAIR      KNEE SURGERY Right     arthroscopy    LUNG BIOPSY      sarcoidosis    NH OPEN IMPLANTATION CRANIAL NERVE MIKE & PULSE GEN  N/A 5/11/2022    Procedure: INSERTION UPPER AIRWAY STIMULATOR;  Surgeon: Gurvinder Earl MD;  Location: [facility];  Service: ENT     Family History   Problem Relation Age of Onset    Emphysema Father     Heart attack Mother     Lung cancer Brother     Diabetes Sister     Anxiety disorder Sister     Sleep apnea Sister     No Known Problems Son          Meds/Allergies     Current Outpatient Medications:     ALPRAZolam (XANAX) 0.5 mg tablet, TAKE 1/2 1 TABLETS (0.25 0.5 MG TOTAL) BY MOUTH DAILY AS NEEDED. FOR ANXIETY, Disp: , Rfl:     ascorbic acid (VITAMIN C) 500 MG tablet, Take 500 mg by mouth daily, Disp: , Rfl:     [employer] ASPIRIN ADULT LOW DOSE 81 MG chewable tablet, Chew 81 mg daily, Disp: , Rfl:     DULoxetine (CYMBALTA) 30 mg delayed release capsule, Take 30 mg by mouth daily  , Disp: , Rfl:     esomeprazole (NexIUM) 40 MG capsule, Take 40 mg by mouth every morning before breakfast, Disp: , Rfl:     levothyroxine 25 mcg tablet, Take 25 mcg by mouth daily  , Disp: , Rfl:     lisinopril (ZESTRIL) 20 mg tablet, Take 20 mg by mouth daily  , Disp: , Rfl:     metFORMIN (GLUCOPHAGE) 1000 MG tablet, Take 1,000 mg by mouth 2 (two) times a day with meals, Disp: , Rfl:     metoprolol tartrate (LOPRESSOR) 50 mg tablet, Take 25 mg by mouth every 12 (twelve) hours  , Disp: , Rfl:     Omega-3 1000 MG CAPS, Take 1,200 mg by mouth daily, Disp: , Rfl:     prasugrel (EFFIENT) tablet, Take 10 mg by mouth daily  , Disp: , Rfl:     rosuvastatin (CRESTOR) 20 MG tablet, Take 20 mg by mouth daily  , Disp: , Rfl:     Silodosin 8 MG CAPS, Take 1 capsule by mouth daily, Disp: 90 capsule, Rfl: 1    tadalafil (CIALIS) 20 MG tablet, Take 1 tablet (20 mg total) by mouth daily as needed for erectile dysfunction (no more than 2 doses weekly), Disp: 30 tablet, Rfl: 0  Allergies   Allergen Reactions    Omeprazole      Not effective     Sulfa Antibiotics Nausea Only    Amoxicillin Nausea Only    Atorvastatin Myalgia    Morphine Nausea Only  "      Vitals: Blood pressure 104/56, pulse 62, height 5' 8\" (1.727 m), weight 88 kg (194 lb). Body mass index is 29.5 kg/m².        Physical Exam  General:  Awake alert and oriented x 3, conversant without conversational dyspnea, NAD, normal affect  HEENT:   Sclera noninjected, nonicteric OU, Nares patent,  no craniofacial abnormalities, Mucous membranes, moist, no oral lesions, normal dentition  NECK:  Trachea midline, no accessory muscle use, no stridor  PULM: No distress no accessory muscle use  EXT: No cyanosis, no clubbing, no edema\  NEURO: no focal neurologic deficits, AAOx3, moving all extremities appropriately    Labs: I have personally reviewed pertinent lab results., ABG: No results found for: \"PHART\", \"ZRU7CQG\", \"PO2ART\", \"FOM7GSP\", \"O7AVEUFS\", \"BEART\", \"SOURCE\", BNP: No results found for: \"BNP\", CBC: No results found for: \"WBC\", \"HGB\", \"HCT\", \"MCV\", \"PLT\", \"ADJUSTEDWBC\", \"RBC\", \"MCH\", \"MCHC\", \"RDW\", \"MPV\", \"NRBC\", CMP: No results found for: \"SODIUM\", \"K\", \"CL\", \"CO2\", \"ANIONGAP\", \"BUN\", \"CREATININE\", \"GLUCOSE\", \"CALCIUM\", \"AST\", \"ALT\", \"ALKPHOS\", \"PROT\", \"BILITOT\", \"EGFR\", PT/INR: No results found for: \"PT\", \"INR\", Troponin: No results found for: \"TROPONINI\"  Lab Results   Component Value Date    WBC 5.9 04/15/2022    HGB 14.2 04/15/2022    HCT 42.8 04/15/2022    MCV 90 04/15/2022     04/15/2022     Lab Results   Component Value Date    CALCIUM 8.9 06/19/2024    K 4.6 06/19/2024    CO2 29 06/19/2024     06/19/2024    BUN 13 06/19/2024    CREATININE 1.08 06/19/2024     No results found for: \"IGE\"  Lab Results   Component Value Date    ALT 20 06/19/2024    AST 22 06/19/2024    ALKPHOS 58 06/19/2024           Sleep studies: I have personally reviewed pertinent reports.    HST 2021: AHI 17.5 events/hr --> moderate IVY       PSG with inspire 2022  Patient slept 338 minutes out of 371 minutes representing sleep efficiency of 91%.  Sleep architecture demonstrated normal sleep latency and increased " "REM latency.  It also demonstrated increased stage N1 and stage N2 sleep, and decreased stage N3 and REM sleep.  Oxygenation was adequate throughout the night with 0.8 minutes below 90% saturation and lowest SpO2 of 88%.  EKG revealed occasional PVCs but was otherwise unremarkable.  Inspire titration was initiated at 1.3 V and was increased up to 1.7 V.  At 1.7 V he did well with for hypopnea episodes (AHI of 0.9 events per hour) which was tested with adequate sleep time, supine position, and REM sleep.  Patient still had some snoring at 1.7 V stimulation. Therefore I will recommend to increase inspire titration up to 1.7 V and go higher up to1.8 V or even 1.9 V for optimal management of sleep apnea and better sleep.  Clinical correlation suggested      Compliance         Lázaro Gavin MD  [facility]       Portions of the record may have been created with voice recognition software. Occasional wrong word or \"sound a like\" substitutions may have occurred due to the inherent limitations of voice recognition software. Read the chart carefully and recognize, using context, where substitutions have occurred.  "

## 2024-10-14 DIAGNOSIS — N52.9 ERECTILE DYSFUNCTION, UNSPECIFIED ERECTILE DYSFUNCTION TYPE: ICD-10-CM

## 2024-10-14 DIAGNOSIS — N40.0 BPH WITHOUT OBSTRUCTION/LOWER URINARY TRACT SYMPTOMS: ICD-10-CM

## 2024-10-15 RX ORDER — SILODOSIN 8 MG/1
1 CAPSULE ORAL DAILY
Qty: 90 CAPSULE | Refills: 1 | Status: SHIPPED | OUTPATIENT
Start: 2024-10-15

## 2024-10-15 RX ORDER — TADALAFIL 20 MG/1
20 TABLET ORAL DAILY PRN
Qty: 30 TABLET | Refills: 5 | Status: SHIPPED | OUTPATIENT
Start: 2024-10-15

## 2024-10-21 ENCOUNTER — TELEPHONE (OUTPATIENT)
Dept: UROLOGY | Facility: MEDICAL CENTER | Age: 64
End: 2024-10-21

## 2024-12-03 ENCOUNTER — TELEPHONE (OUTPATIENT)
Age: 64
End: 2024-12-03

## 2024-12-03 NOTE — TELEPHONE ENCOUNTER
Returned call from patient, left message patient has been scheduled for follow-up with Inspire appointment 12/11/2024 with Dr. Stein in [place].    Message from patient routed to Dr. Gavin.    Inspire representatives notified via email.

## 2024-12-03 NOTE — TELEPHONE ENCOUNTER
Patient calling to get advice about his inspire device as he feels it pushing against his throat.  Please call and assist.

## 2024-12-09 NOTE — PROGRESS NOTES
Name: Kyler Vazquez      : 1960      MRN: [id number]  Encounter Provider: Ricardo Stein MD  Encounter Date: 2024   Encounter department: [facility] SLEEP [facility]IC[facility]  :  Assessment & Plan  IVY (obstructive sleep apnea)  Moderate IVY AHI 24 status post hypoglossal nerve stimulator placement 2022  Significant improvement in snoring, sleep quality, excessive daytime sleepiness with HGN    He has been at 1.6V at 90/33 and he is having an uncomfortable back of throat pulling sensation that prevents him from going back to sleep.    I evaluated tongue motion and at 1.6V, 90/33, the tongue protrusion is robust but harsh  I decreased PW/R to 60/40 and     Inspire Settings  Incoming Settings  Electrode configuration A + - +   Amplitude (V) 1.6  Range (V) 1.6 to 2.1  Pulse width (us)/ Rate (Hz)  90/33  Start delay (min) 60  Pause Time (min) 15  Therapy Duration (hr) 10    Outgoing Settings  Electrode configuration A + - +   Amplitude (V) 1.6  Range (V) 1.4 to 2.1  Pulse width (us)/ Rate (Hz)  60/40  Start delay (min) 60  Pause Time (min) 15  Therapy Duration (hr) 10    I also checked sensing lead settings and they are at default settings  Continue at 1.6V, can advance to 1.7V if tolerating 1.6V at current PW/R.  1.7V based on sleep study was his optimal amplitude  Follow up in 3 months with Dr. Gavin           History of Present Illness   HPI  64 year old male with a history of GERD, HTN, T2DM, anxiety/depression, moderate IVY s/p Inspire implantation who is presenting for follow up    24 - follow up with me - Patient recently called about inspire as he feels it pushing against his throat.  The tongue protrusion was too intense and it makes it difficult to fall back asleep.  Otherwise he is using Inspire every night.  He is at 1.6V and has been since last visit with Dr. Gavin.  However, this is level 1 for him and he has no levels to step down to    7/[id number] with Dr. Gavin-tongue  motions and waveform was evaluated and there was no abnormalities.  He notes significant improvement of snoring, excessive daytime sleepiness and averaging 9 to 10 hours of sleep per night.    Inspire implanted June 2022    Review of Systems I have personally reviewed the MA's review of systems and made changes as necessary.    Past Medical History   Past Medical History:   Diagnosis Date    Allergies     Anxiety     Arthritis     Asthma     BPH (benign prostatic hyperplasia)     CPAP (continuous positive airway pressure) dependence     Depression     Diabetes mellitus (HCC)     Disease of thyroid gland     GERD (gastroesophageal reflux disease)     Hyperchloremia     Hyperlipidemia     Hypertension     Myocardial infarction (HCC)     PONV (postoperative nausea and vomiting)     Sarcoidosis     Sleep apnea      Past Surgical History:   Procedure Laterality Date    CARDIAC SURGERY      3 stents    CARPAL TUNNEL RELEASE Bilateral     COLONOSCOPY      EXAMINATION UNDER ANESTHESIA N/A 1/12/2022    Procedure: DRUG INDUCED SLEEP ENDOSCOPY (DISE);  Surgeon: Gurvinder Earl MD;  Location: [facility];  Service: ENT    HERNIA REPAIR      KNEE SURGERY Right     arthroscopy    LUNG BIOPSY      sarcoidosis    AR OPEN IMPLANTATION CRANIAL NERVE MIKE & PULSE GEN N/A 5/11/2022    Procedure: INSERTION UPPER AIRWAY STIMULATOR;  Surgeon: Gurvinder Earl MD;  Location: [facility];  Service: ENT     Family History   Problem Relation Age of Onset    Emphysema Father     Heart attack Mother     Lung cancer Brother     Diabetes Sister     Anxiety disorder Sister     Sleep apnea Sister     No Known Problems Son       reports that he has never smoked. He has never used smokeless tobacco. He reports current alcohol use. He reports that he does not use drugs.  Current Outpatient Medications on File Prior to Visit   Medication Sig Dispense Refill    ALPRAZolam (XANAX) 0.5 mg tablet TAKE 1/2 1 TABLETS (0.25 0.5 MG TOTAL) BY MOUTH DAILY AS  NEEDED. FOR ANXIETY      ascorbic acid (VITAMIN C) 500 MG tablet Take 500 mg by mouth daily      [employer] ASPIRIN ADULT LOW DOSE 81 MG chewable tablet Chew 81 mg daily      DULoxetine (CYMBALTA) 30 mg delayed release capsule Take 30 mg by mouth daily        esomeprazole (NexIUM) 40 MG capsule Take 40 mg by mouth every morning before breakfast      levothyroxine 25 mcg tablet Take 25 mcg by mouth daily        lisinopril (ZESTRIL) 20 mg tablet Take 20 mg by mouth daily        metFORMIN (GLUCOPHAGE) 1000 MG tablet Take 1,000 mg by mouth 2 (two) times a day with meals      metoprolol tartrate (LOPRESSOR) 50 mg tablet Take 25 mg by mouth every 12 (twelve) hours        Omega-3 1000 MG CAPS Take 1,200 mg by mouth daily      prasugrel (EFFIENT) tablet Take 10 mg by mouth daily        rosuvastatin (CRESTOR) 20 MG tablet Take 20 mg by mouth daily        Silodosin 8 MG CAPS Take 1 capsule by mouth daily 90 capsule 1    tadalafil (CIALIS) 20 MG tablet Take 1 tablet (20 mg total) by mouth daily as needed for erectile dysfunction (no more than 2 doses weekly) 30 tablet 5     No current facility-administered medications on file prior to visit.     Allergies   Allergen Reactions    Omeprazole      Not effective     Sulfa Antibiotics Nausea Only    Amoxicillin Nausea Only    Atorvastatin Myalgia    Morphine Nausea Only         Objective   There were no vitals taken for this visit.    Physical Exam  Constitutional:       General: He is not in acute distress.     Appearance: Normal appearance. He is not ill-appearing, toxic-appearing or diaphoretic.   HENT:      Head: Normocephalic and atraumatic.      Mouth/Throat:      Mouth: Mucous membranes are moist.      Pharynx: Oropharynx is clear. No oropharyngeal exudate.      Comments: Tongue protrusion midline  Eyes:      Extraocular Movements: Extraocular movements intact.      Conjunctiva/sclera: Conjunctivae normal.   Cardiovascular:      Rate and Rhythm: Normal rate.   Pulmonary:       Effort: Pulmonary effort is normal. No respiratory distress.      Breath sounds: Normal breath sounds.   Abdominal:      Tenderness: There is no guarding.   Musculoskeletal:         General: Normal range of motion.      Cervical back: Normal range of motion and neck supple. No rigidity.   Skin:     Coloration: Skin is not jaundiced or pale.   Neurological:      General: No focal deficit present.      Mental Status: He is alert. Mental status is at baseline.   Psychiatric:         Mood and Affect: Mood normal.       Neurological Exam  Mental Status  Alert.    Cranial Nerves  CN III, IV, VI: Extraocular movements intact bilaterally.      I reviewed compliance data with the patient.          9/30/22 -sleep study with inspire-overall AHI 5.0.  At 1.7 V residual AHI 0.9.  Snoring occurred at 1.7 V.  No PLM's.  43% in supine position

## 2024-12-09 NOTE — ASSESSMENT & PLAN NOTE
Moderate IVY AHI 24 status post hypoglossal nerve stimulator placement June 2022  Significant improvement in snoring, sleep quality, excessive daytime sleepiness with HGN    He has been at 1.6V at 90/33 and he is having an uncomfortable back of throat pulling sensation that prevents him from going back to sleep.    I evaluated tongue motion and at 1.6V, 90/33, the tongue protrusion is robust but harsh  I decreased PW/R to 60/40 and     Inspire Settings  Incoming Settings  Electrode configuration A + - +   Amplitude (V) 1.6  Range (V) 1.6 to 2.1  Pulse width (us)/ Rate (Hz)  90/33  Start delay (min) 60  Pause Time (min) 15  Therapy Duration (hr) 10    Outgoing Settings  Electrode configuration A + - +   Amplitude (V) 1.6  Range (V) 1.4 to 2.1  Pulse width (us)/ Rate (Hz)  60/40  Start delay (min) 60  Pause Time (min) 15  Therapy Duration (hr) 10    I also checked sensing lead settings and they are at default settings  Continue at 1.6V, can advance to 1.7V if tolerating 1.6V at current PW/R.  1.7V based on sleep study was his optimal amplitude  Follow up in 3 months with Dr. Gavin

## 2024-12-11 ENCOUNTER — OFFICE VISIT (OUTPATIENT)
Dept: SLEEP CENTER | Facility: CLINIC | Age: 64
End: 2024-12-11
Payer: COMMERCIAL

## 2024-12-11 VITALS
HEIGHT: 68 IN | SYSTOLIC BLOOD PRESSURE: 108 MMHG | BODY MASS INDEX: 29.7 KG/M2 | DIASTOLIC BLOOD PRESSURE: 62 MMHG | WEIGHT: 196 LBS

## 2024-12-11 DIAGNOSIS — G47.33 OSA (OBSTRUCTIVE SLEEP APNEA): Primary | ICD-10-CM

## 2024-12-11 PROCEDURE — 99213 OFFICE O/P EST LOW 20 MIN: CPT | Performed by: INTERNAL MEDICINE

## 2024-12-11 PROCEDURE — 95971 ALYS SMPL SP/PN NPGT W/PRGRM: CPT | Performed by: INTERNAL MEDICINE

## 2025-03-05 DIAGNOSIS — N40.0 BPH WITHOUT OBSTRUCTION/LOWER URINARY TRACT SYMPTOMS: ICD-10-CM

## 2025-03-05 RX ORDER — SILODOSIN 8 MG/1
1 CAPSULE ORAL DAILY
Qty: 90 CAPSULE | Refills: 1 | OUTPATIENT
Start: 2025-03-05

## 2025-05-28 DIAGNOSIS — N40.0 BPH WITHOUT OBSTRUCTION/LOWER URINARY TRACT SYMPTOMS: ICD-10-CM

## 2025-05-28 RX ORDER — SILODOSIN 8 MG/1
1 CAPSULE ORAL DAILY
Qty: 90 CAPSULE | Refills: 0 | Status: SHIPPED | OUTPATIENT
Start: 2025-05-28

## (undated) DEVICE — 3M™ TEGADERM™ TRANSPARENT FILM DRESSING FRAME STYLE, 1624W, 2-3/8 IN X 2-3/4 IN (6 CM X 7 CM), 100/CT 4CT/CASE: Brand: 3M™ TEGADERM™

## (undated) DEVICE — GAUZE SPONGES,16 PLY: Brand: CURITY

## (undated) DEVICE — AMBU ASCOPE 4 RHINOLARYNGO SLIM SINGLE-USE, FLEXIBLE RHINOLARYNGOSCOPE STERILE FROM PACKAGE. INSERTION CORD DIAMETER 3.0 MM, LENGHT OF 300 MM. AND A 130-DEGREE BENDING ANGLE. MINIMUM PURCHASE 5 PCS = 1 BOX: Brand: ASCOPE™ 4 RHINOLARYNGO SLIM

## (undated) DEVICE — PACK UNIVERSAL NECK

## (undated) DEVICE — GLOVE SRG BIOGEL 7.5

## (undated) DEVICE — 3M™ STERI-STRIP™ REINFORCED ADHESIVE SKIN CLOSURES, R1547, 1/2 IN X 4 IN (12 MM X 100 MM), 6 STRIPS/ENVELOPE: Brand: 3M™ STERI-STRIP™

## (undated) DEVICE — 3M™ IOBAN™ 2 ANTIMICROBIAL INCISE DRAPE 6650EZ: Brand: IOBAN™ 2

## (undated) DEVICE — NEEDLE 25G X 1 1/2

## (undated) DEVICE — CATHETER 8591-38 PASSER,38CM

## (undated) DEVICE — SYRINGE 10ML LL

## (undated) DEVICE — BULB SYRINGE,IRRIGATION WITH PROTECTIVE CAP: Brand: DOVER

## (undated) DEVICE — IV CATH INTROCAN 18G X 1 1/4 SAFETY

## (undated) DEVICE — SUPER SPONGES,MEDIUM: Brand: KERLIX

## (undated) DEVICE — 3M™ TEGADERM™ TRANSPARENT FILM DRESSING FRAME STYLE, 1626W, 4 IN X 4-3/4 IN (10 CM X 12 CM), 50/CT 4CT/CASE: Brand: 3M™ TEGADERM™

## (undated) DEVICE — ELECTRODE 8227304 5PK PRASS PR 18MM ROHS

## (undated) DEVICE — SUT SILK 2-0 SH 30 IN K833H

## (undated) DEVICE — NEEDLE 18 G X 1 1/2

## (undated) DEVICE — WET SKIN PREP TRAY: Brand: MEDLINE INDUSTRIES, INC.

## (undated) DEVICE — ALCOHOL PREP, 2 PLY, LARGE, MADE IN USA, SATURATED WITH 70% ISOPROPYL ALCOHOL, FOR EXTERNAL USE ONLY: Brand: WEBCOL

## (undated) DEVICE — SUT SILK PERMA-HAND 3-0 18IN A182H

## (undated) DEVICE — 3M™ STERI-STRIP™ REINFORCED ADHESIVE SKIN CLOSURES, R1542, 1/4 IN X 1-1/2 IN (6 MM X 38 MM), 6 STRIPS/ENVELOPE: Brand: 3M™ STERI-STRIP™

## (undated) DEVICE — VESSEL LOOP MAXI - RED

## (undated) DEVICE — SUT SILK 3-0 RB-1 CV-23 18IN C053D

## (undated) DEVICE — INTENDED FOR TISSUE SEPARATION, AND OTHER PROCEDURES THAT REQUIRE A SHARP SURGICAL BLADE TO PUNCTURE OR CUT.: Brand: BARD-PARKER SAFETY BLADES SIZE 15, STERILE

## (undated) DEVICE — TONGUE DEPRESSOR STERILE

## (undated) DEVICE — GLOVE INDICATOR PI UNDERGLOVE SZ 7.5 BLUE

## (undated) DEVICE — SUT SILK 3-0 SH CR/8 18 IN C013D

## (undated) DEVICE — BAG DECANTER

## (undated) DEVICE — SUT MONOCRYL 4-0 PS-2 27 IN Y426H

## (undated) DEVICE — SUT VICRYL 3-0 SH 27 IN J416H

## (undated) DEVICE — BIPOLAR CORD DISP

## (undated) DEVICE — SKIN MARKER DUAL TIP WITH RULER CAP, FLEXIBLE RULER AND LABELS: Brand: DEVON

## (undated) DEVICE — PROVE COVER: Brand: UNBRANDED

## (undated) DEVICE — TIBURON SPLIT SHEET: Brand: CONVERTORS

## (undated) DEVICE — ELECTRODE BLADE MOD E-Z CLEAN  2.75IN 7CM -0012AM

## (undated) DEVICE — 10FR FRAZIER SUCTION HANDLE: Brand: CARDINAL HEALTH

## (undated) DEVICE — DRAPE SHEET THREE QUARTER

## (undated) DEVICE — VESSEL LOOPS X-RAY DETECTABLE: Brand: DEROYAL

## (undated) DEVICE — MASTISOL LIQ ADHESIVE 2/3ML

## (undated) DEVICE — REMOTE SLEEP INSPIRE

## (undated) DEVICE — PROBE 8225401 5PK SD-SD BIPOL STIM ROHS